# Patient Record
Sex: FEMALE | Race: BLACK OR AFRICAN AMERICAN | NOT HISPANIC OR LATINO
[De-identification: names, ages, dates, MRNs, and addresses within clinical notes are randomized per-mention and may not be internally consistent; named-entity substitution may affect disease eponyms.]

---

## 2024-07-11 ENCOUNTER — ASOB RESULT (OUTPATIENT)
Age: 44
End: 2024-07-11

## 2024-07-11 ENCOUNTER — RESULT REVIEW (OUTPATIENT)
Age: 44
End: 2024-07-11

## 2024-07-11 ENCOUNTER — INPATIENT (INPATIENT)
Facility: HOSPITAL | Age: 44
LOS: 3 days | Discharge: HOME | End: 2024-07-15
Attending: SPECIALIST | Admitting: SPECIALIST
Payer: COMMERCIAL

## 2024-07-11 ENCOUNTER — EMERGENCY (EMERGENCY)
Facility: HOSPITAL | Age: 44
LOS: 1 days | Discharge: STILL ADMITTED OR ADMINISTRATIVE | End: 2024-07-11
Admitting: EMERGENCY MEDICINE
Payer: COMMERCIAL

## 2024-07-11 ENCOUNTER — APPOINTMENT (OUTPATIENT)
Dept: ANTEPARTUM | Facility: CLINIC | Age: 44
End: 2024-07-11
Payer: COMMERCIAL

## 2024-07-11 VITALS
OXYGEN SATURATION: 99 % | HEART RATE: 108 BPM | SYSTOLIC BLOOD PRESSURE: 121 MMHG | TEMPERATURE: 98 F | DIASTOLIC BLOOD PRESSURE: 81 MMHG | RESPIRATION RATE: 20 BRPM

## 2024-07-11 VITALS — SYSTOLIC BLOOD PRESSURE: 117 MMHG | DIASTOLIC BLOOD PRESSURE: 65 MMHG | RESPIRATION RATE: 16 BRPM

## 2024-07-11 DIAGNOSIS — Z98.890 OTHER SPECIFIED POSTPROCEDURAL STATES: Chronic | ICD-10-CM

## 2024-07-11 DIAGNOSIS — O26.899 OTHER SPECIFIED PREGNANCY RELATED CONDITIONS, UNSPECIFIED TRIMESTER: ICD-10-CM

## 2024-07-11 DIAGNOSIS — N12 TUBULO-INTERSTITIAL NEPHRITIS, NOT SPECIFIED AS ACUTE OR CHRONIC: ICD-10-CM

## 2024-07-11 LAB
ALBUMIN SERPL ELPH-MCNC: 3 G/DL — LOW (ref 3.3–5)
ALP SERPL-CCNC: 91 U/L — SIGNIFICANT CHANGE UP (ref 40–120)
ALT FLD-CCNC: 41 U/L — HIGH (ref 4–33)
AMYLASE P1 CFR SERPL: 63 U/L — SIGNIFICANT CHANGE UP (ref 25–125)
ANION GAP SERPL CALC-SCNC: 13 MMOL/L — SIGNIFICANT CHANGE UP (ref 7–14)
APPEARANCE UR: ABNORMAL
AST SERPL-CCNC: 24 U/L — SIGNIFICANT CHANGE UP (ref 4–32)
BACTERIA # UR AUTO: ABNORMAL /HPF
BASOPHILS # BLD AUTO: 0.02 K/UL — SIGNIFICANT CHANGE UP (ref 0–0.2)
BASOPHILS NFR BLD AUTO: 0.2 % — SIGNIFICANT CHANGE UP (ref 0–2)
BILIRUB SERPL-MCNC: 0.2 MG/DL — SIGNIFICANT CHANGE UP (ref 0.2–1.2)
BILIRUB UR-MCNC: NEGATIVE — SIGNIFICANT CHANGE UP
BLD GP AB SCN SERPL QL: NEGATIVE — SIGNIFICANT CHANGE UP
BUN SERPL-MCNC: 4 MG/DL — LOW (ref 7–23)
CALCIUM SERPL-MCNC: 10.5 MG/DL — SIGNIFICANT CHANGE UP (ref 8.4–10.5)
CAST: 10 /LPF — HIGH (ref 0–4)
CHLORIDE SERPL-SCNC: 106 MMOL/L — SIGNIFICANT CHANGE UP (ref 98–107)
CO2 SERPL-SCNC: 17 MMOL/L — LOW (ref 22–31)
COLOR SPEC: YELLOW — SIGNIFICANT CHANGE UP
CREAT SERPL-MCNC: 0.7 MG/DL — SIGNIFICANT CHANGE UP (ref 0.5–1.3)
DIFF PNL FLD: ABNORMAL
EGFR: 111 ML/MIN/1.73M2 — SIGNIFICANT CHANGE UP
EOSINOPHIL # BLD AUTO: 0.01 K/UL — SIGNIFICANT CHANGE UP (ref 0–0.5)
EOSINOPHIL NFR BLD AUTO: 0.1 % — SIGNIFICANT CHANGE UP (ref 0–6)
GLUCOSE SERPL-MCNC: 98 MG/DL — SIGNIFICANT CHANGE UP (ref 70–99)
GLUCOSE UR QL: NEGATIVE MG/DL — SIGNIFICANT CHANGE UP
HBV SURFACE AG SERPL QL IA: SIGNIFICANT CHANGE UP
HCT VFR BLD CALC: 33.4 % — LOW (ref 34.5–45)
HGB BLD-MCNC: 10.9 G/DL — LOW (ref 11.5–15.5)
HIV 1+2 AB+HIV1 P24 AG SERPL QL IA: SIGNIFICANT CHANGE UP
IANC: 10.8 K/UL — HIGH (ref 1.8–7.4)
IMM GRANULOCYTES NFR BLD AUTO: 1.2 % — HIGH (ref 0–0.9)
KETONES UR-MCNC: 15 MG/DL
LEUKOCYTE ESTERASE UR-ACNC: ABNORMAL
LIDOCAIN IGE QN: 15 U/L — SIGNIFICANT CHANGE UP (ref 7–60)
LYMPHOCYTES # BLD AUTO: 0.82 K/UL — LOW (ref 1–3.3)
LYMPHOCYTES # BLD AUTO: 6.7 % — LOW (ref 13–44)
MCHC RBC-ENTMCNC: 27.5 PG — SIGNIFICANT CHANGE UP (ref 27–34)
MCHC RBC-ENTMCNC: 32.6 GM/DL — SIGNIFICANT CHANGE UP (ref 32–36)
MCV RBC AUTO: 84.3 FL — SIGNIFICANT CHANGE UP (ref 80–100)
MONOCYTES # BLD AUTO: 0.49 K/UL — SIGNIFICANT CHANGE UP (ref 0–0.9)
MONOCYTES NFR BLD AUTO: 4 % — SIGNIFICANT CHANGE UP (ref 2–14)
NEUTROPHILS # BLD AUTO: 10.8 K/UL — HIGH (ref 1.8–7.4)
NEUTROPHILS NFR BLD AUTO: 87.8 % — HIGH (ref 43–77)
NITRITE UR-MCNC: POSITIVE
NRBC # BLD: 0 /100 WBCS — SIGNIFICANT CHANGE UP (ref 0–0)
NRBC # FLD: 0 K/UL — SIGNIFICANT CHANGE UP (ref 0–0)
PCP SPEC-MCNC: SIGNIFICANT CHANGE UP
PH UR: 6 — SIGNIFICANT CHANGE UP (ref 5–8)
PLATELET # BLD AUTO: 200 K/UL — SIGNIFICANT CHANGE UP (ref 150–400)
POTASSIUM SERPL-MCNC: 3.8 MMOL/L — SIGNIFICANT CHANGE UP (ref 3.5–5.3)
POTASSIUM SERPL-SCNC: 3.8 MMOL/L — SIGNIFICANT CHANGE UP (ref 3.5–5.3)
PROT SERPL-MCNC: 6.7 G/DL — SIGNIFICANT CHANGE UP (ref 6–8.3)
PROT UR-MCNC: 30 MG/DL
RBC # BLD: 3.96 M/UL — SIGNIFICANT CHANGE UP (ref 3.8–5.2)
RBC # FLD: 14.9 % — HIGH (ref 10.3–14.5)
RBC CASTS # UR COMP ASSIST: 4 /HPF — SIGNIFICANT CHANGE UP (ref 0–4)
REVIEW: SIGNIFICANT CHANGE UP
RH IG SCN BLD-IMP: POSITIVE — SIGNIFICANT CHANGE UP
RH IG SCN BLD-IMP: POSITIVE — SIGNIFICANT CHANGE UP
SODIUM SERPL-SCNC: 136 MMOL/L — SIGNIFICANT CHANGE UP (ref 135–145)
SP GR SPEC: 1.02 — SIGNIFICANT CHANGE UP (ref 1–1.03)
SQUAMOUS # UR AUTO: 10 /HPF — HIGH (ref 0–5)
UROBILINOGEN FLD QL: 0.2 MG/DL — SIGNIFICANT CHANGE UP (ref 0.2–1)
WBC # BLD: 12.29 K/UL — HIGH (ref 3.8–10.5)
WBC # FLD AUTO: 12.29 K/UL — HIGH (ref 3.8–10.5)
WBC UR QL: 883 /HPF — HIGH (ref 0–5)

## 2024-07-11 PROCEDURE — L9996: CPT

## 2024-07-11 PROCEDURE — 76817 TRANSVAGINAL US OBSTETRIC: CPT | Mod: 26

## 2024-07-11 PROCEDURE — 76770 US EXAM ABDO BACK WALL COMP: CPT | Mod: 26

## 2024-07-11 PROCEDURE — 76815 OB US LIMITED FETUS(S): CPT | Mod: 26

## 2024-07-11 RX ORDER — CEFTRIAXONE SODIUM 500 MG
1000 VIAL (EA) INJECTION EVERY 24 HOURS
Refills: 0 | Status: DISCONTINUED | OUTPATIENT
Start: 2024-07-11 | End: 2024-07-15

## 2024-07-11 RX ORDER — DEXTROSE MONOHYDRATE AND SODIUM CHLORIDE 5; .3 G/100ML; G/100ML
1000 INJECTION, SOLUTION INTRAVENOUS ONCE
Refills: 0 | Status: COMPLETED | OUTPATIENT
Start: 2024-07-11 | End: 2024-07-11

## 2024-07-11 RX ORDER — ACETAMINOPHEN 325 MG
1000 TABLET ORAL ONCE
Refills: 0 | Status: COMPLETED | OUTPATIENT
Start: 2024-07-11 | End: 2024-07-11

## 2024-07-11 RX ORDER — HEPARIN SODIUM 50 [USP'U]/ML
5000 INJECTION, SOLUTION INTRAVENOUS EVERY 12 HOURS
Refills: 0 | Status: DISCONTINUED | OUTPATIENT
Start: 2024-07-11 | End: 2024-07-15

## 2024-07-11 RX ORDER — PRENATAL VIT/IRON FUM/FOLIC AC 60 MG-1 MG
1 TABLET ORAL DAILY
Refills: 0 | Status: DISCONTINUED | OUTPATIENT
Start: 2024-07-11 | End: 2024-07-15

## 2024-07-11 RX ORDER — DEXTROSE MONOHYDRATE AND SODIUM CHLORIDE 5; .3 G/100ML; G/100ML
1000 INJECTION, SOLUTION INTRAVENOUS
Refills: 0 | Status: DISCONTINUED | OUTPATIENT
Start: 2024-07-11 | End: 2024-07-14

## 2024-07-11 RX ADMIN — Medication 1000 MILLIGRAM(S): at 15:40

## 2024-07-11 RX ADMIN — Medication 400 MILLIGRAM(S): at 14:37

## 2024-07-11 RX ADMIN — DEXTROSE MONOHYDRATE AND SODIUM CHLORIDE 150 MILLILITER(S): 5; .3 INJECTION, SOLUTION INTRAVENOUS at 19:36

## 2024-07-11 RX ADMIN — HEPARIN SODIUM 5000 UNIT(S): 50 INJECTION, SOLUTION INTRAVENOUS at 17:59

## 2024-07-11 RX ADMIN — Medication 400 MILLIGRAM(S): at 23:46

## 2024-07-11 RX ADMIN — Medication 1 TABLET(S): at 17:56

## 2024-07-11 RX ADMIN — DEXTROSE MONOHYDRATE AND SODIUM CHLORIDE 150 MILLILITER(S): 5; .3 INJECTION, SOLUTION INTRAVENOUS at 11:25

## 2024-07-11 RX ADMIN — Medication 100 MILLIGRAM(S): at 11:14

## 2024-07-11 RX ADMIN — DEXTROSE MONOHYDRATE AND SODIUM CHLORIDE 1000 MILLILITER(S): 5; .3 INJECTION, SOLUTION INTRAVENOUS at 09:48

## 2024-07-11 NOTE — OB PROVIDER TRIAGE NOTE - NSHPPHYSICALEXAM_GEN_ALL_CORE
ICU Vital Signs Last 24 Hrs  T(C): 37.1 (11 Jul 2024 09:26), Max: 37.1 (11 Jul 2024 08:22)  T(F): 98.78 (11 Jul 2024 09:26), Max: 98.8 (11 Jul 2024 08:22)  HR: 102 (11 Jul 2024 08:22) (102 - 108)  BP: 117/67 (11 Jul 2024 08:22) (117/65 - 121/81)  BP(mean): --  ABP: --  ABP(mean): --  RR: 16 (11 Jul 2024 09:26) (16 - 20)  SpO2: 99% (11 Jul 2024 07:49) (99% - 99%)    General: Female sitting comfortably   Neuro: No facial asymmetry, no slurred speech, moves all 4 extremities  Mood: Alert and lucid, appropriate mood and affect  Head: Normocephalic. Atraumatic.   Eyes: No discharge, lids normal, conjunctiva normal  Lungs: No respiratory distress  Abdomen: Soft, nontender. Gravid. No contractions on palpation. CVA tenderness is negative    GBS collected and held  SSE- os appears closed. FFN collected and held  TVS- Images and report in ASOB-3.18-3.38 cm  NST reactive with mod variability; irritability on toco ICU Vital Signs Last 24 Hrs  T(C): 37.1 (11 Jul 2024 09:26), Max: 37.1 (11 Jul 2024 08:22)  T(F): 98.78 (11 Jul 2024 09:26), Max: 98.8 (11 Jul 2024 08:22)  HR: 102 (11 Jul 2024 08:22) (102 - 108)  BP: 117/67 (11 Jul 2024 08:22) (117/65 - 121/81)  BP(mean): --  ABP: --  ABP(mean): --  RR: 16 (11 Jul 2024 09:26) (16 - 20)  SpO2: 99% (11 Jul 2024 07:49) (99% - 99%)    General: Female sitting comfortably   Neuro: No facial asymmetry, no slurred speech, moves all 4 extremities  Mood: Alert and lucid, appropriate mood and affect  Head: Normocephalic. Atraumatic.   Eyes: No discharge, lids normal, conjunctiva normal  Lungs: No respiratory distress  Abdomen: Soft, nontender. Gravid. No contractions on palpation. CVA tenderness is negative    GBS collected and held  SSE- os appears closed. FFN collected and held  TVS- Images and report in ASOB-3.18-3.38 cm  Abd sono- Images and report in ASOB- sara breech; anterior placenta; MVP-10.08  NST reactive with mod variability; irritability on toco

## 2024-07-11 NOTE — OB PROVIDER TRIAGE NOTE - HISTORY OF PRESENT ILLNESS
45yo female  @ 23.6 wks SLIUP complicated by hypertensive gravidarum on Zofran pump with PNR with Dr Mtz of [facility] here complaining of constant left lower back pain that radiates around the flank and into the LLQ and down the leg that started at 3am and was unrelieved by Tylenol 500 mg taken at 3 am. Pt denies fever/ chills/ hematuria/ dysuria/ urgency/ frequency. Pt reports she she was diagnosed with a UTI 2 weeks ago and was treated and a test of cure was performed and she was scheduled to see her Ob today for results. Pt reports GFM and denies any vaginal bleeding/ LOF. Pt reports she was told she had a low lying placenta previa at 20 weeks.     PNC complicated by UTI 2 weeks ago and was treated

## 2024-07-11 NOTE — OB PROVIDER TRIAGE NOTE - NSOBPROVIDERNOTE_OBGYN_ALL_OB_FT
43yo female  @ 23.6 wks SLIUP with left lower back pain radiating to the LLQ and down the leg since 3am  -TVS reassuring with no evidence of  labor  -CBC/CMP/amylase/lipase/UA sent  -IV hydration with bolus ordered 45yo female  @ 23.6 wks SLIUP with left lower back pain radiating to the LLQ and down the leg since 3am  -TVS reassuring with no evidence of  labor  -CBC/CMP/amylase/lipase/UA sent  -IV hydration with bolus ordered  -----------------  - UA is positive for Nitrites  -pt was discussed with Dr Pisano  -pt ordered for ceftriaxone and renal ultrasound 43yo female  @ 23.6 wks SLIUP with left lower back pain radiating to the LLQ and down the leg since 3am  -TVS reassuring with no evidence of  labor  -CBC/CMP/amylase/lipase/UA sent  -IV hydration with bolus ordered  -----------------  - UA is positive for Nitrites  -pt was discussed with Dr Pisano  -pt ordered for ceftriaxone and renal ultrasound  ------------------------  12:00p-Pt was discussed with Dr Pisano and Dr Coello  -pt was admitted for pyelonephritis  -pt ordered for Ceftiaxone 1000mg QD and prophylactic heparin   -renal ultrasound is unremarkable

## 2024-07-11 NOTE — OB RN TRIAGE NOTE - FALL HARM RISK - UNIVERSAL INTERVENTIONS
Bed in lowest position, wheels locked, appropriate side rails in place/Call bell, personal items and telephone in reach/Instruct patient to call for assistance before getting out of bed or chair/Non-slip footwear when patient is out of bed/[place] to call system/Physically safe environment - no spills, clutter or unnecessary equipment/Purposeful Proactive Rounding/Room/bathroom lighting operational, light cord in reach

## 2024-07-11 NOTE — OB PROVIDER H&P - ASSESSMENT
45 yo female  @ 23.6 wks SLIUP here with pyelonephritis   -pt was admitted and discussed with Dr Pisano PGY4 and Dr Coello  -pt for ceftriaxone and prophylactic heparin  -UCX sent  -Utox ordered  -regular diet  -rpt CBC in morning   -Risks and benefits, alternatives, and possible complications have been discussed in detail with pt in her native language. Preadmission admission , and post admission procedures and expectations were discussed in detail. All questions answered, all appropriate hospital consents were signed. Anticipate normal vaginal delivery.  -Informed consent was obtained. The following were discussed:    -induction/augmentation of labor: use of medication and/or cook balloon to begin or enhance labor    -obstetrical management including internal fetal/contraction monitoring

## 2024-07-11 NOTE — OB PROVIDER H&P - NSHPPHYSICALEXAM_GEN_ALL_CORE
ICU Vital Signs Last 24 Hrs  T(C): 37.1 (11 Jul 2024 09:26), Max: 37.1 (11 Jul 2024 08:22)  T(F): 98.78 (11 Jul 2024 09:26), Max: 98.8 (11 Jul 2024 08:22)  HR: 102 (11 Jul 2024 08:22) (102 - 108)  BP: 117/67 (11 Jul 2024 08:22) (117/65 - 121/81)  BP(mean): --  ABP: --  ABP(mean): --  RR: 16 (11 Jul 2024 09:26) (16 - 20)  SpO2: 99% (11 Jul 2024 07:49) (99% - 99%)    General: Female sitting comfortably   Neuro: No facial asymmetry, no slurred speech, moves all 4 extremities  Mood: Alert and lucid, appropriate mood and affect  Head: Normocephalic. Atraumatic.   Eyes: No discharge, lids normal, conjunctiva normal  Lungs: No respiratory distress  Abdomen: Soft, nontender. Gravid. No contractions on palpation. CVA tenderness is negative    GBS collected and held  SSE- os appears closed. FFN collected and held  TVS- Images and report in ASOB-3.18-3.38 cm  Abd sono- Images and report in ASOB- sara breech; anterior placenta; MVP-10.08  NST reactive with mod variability; irritability on toco

## 2024-07-11 NOTE — OB RN PATIENT PROFILE - FALL HARM RISK - UNIVERSAL INTERVENTIONS
Bed in lowest position, wheels locked, appropriate side rails in place/Call bell, personal items and telephone in reach/Instruct patient to call for assistance before getting out of bed or chair/Non-slip footwear when patient is out of bed/[place] to call system/Physically safe environment - no spills, clutter or unnecessary equipment/Purposeful Proactive Rounding/Room/bathroom lighting operational, light cord in reach

## 2024-07-11 NOTE — OB PROVIDER TRIAGE NOTE - NSHPLABSRESULTS_GEN_ALL_CORE
Urinalysis Basic - ( 2024 10:05 )    Color: Yellow / Appearance: Turbid / S.023 / pH: x  Gluc: 98 mg/dL / Ketone: 15 mg/dL  / Bili: Negative / Urobili: 0.2 mg/dL   Blood: x / Protein: 30 mg/dL / Nitrite: Positive   Leuk Esterase: Moderate / RBC: x / WBC x   Sq Epi: x / Non Sq Epi: x / Bacteria: x    136 I 106 I 4  ----------------<98     AST-24; ALT- 41 <H>; Amylase- 63; lipase- 15  3.8 I 17 I 0.70                 10.9  12.29>----------<200               33.4

## 2024-07-12 ENCOUNTER — ASOB RESULT (OUTPATIENT)
Age: 44
End: 2024-07-12

## 2024-07-12 ENCOUNTER — APPOINTMENT (OUTPATIENT)
Dept: ANTEPARTUM | Facility: HOSPITAL | Age: 44
End: 2024-07-12
Payer: COMMERCIAL

## 2024-07-12 PROBLEM — R11.10 VOMITING, UNSPECIFIED: Chronic | Status: ACTIVE | Noted: 2024-07-11

## 2024-07-12 LAB
BASOPHILS # BLD AUTO: 0.03 K/UL — SIGNIFICANT CHANGE UP (ref 0–0.2)
BASOPHILS NFR BLD AUTO: 0.3 % — SIGNIFICANT CHANGE UP (ref 0–2)
EOSINOPHIL # BLD AUTO: 0.1 K/UL — SIGNIFICANT CHANGE UP (ref 0–0.5)
EOSINOPHIL NFR BLD AUTO: 1.1 % — SIGNIFICANT CHANGE UP (ref 0–6)
HCT VFR BLD CALC: 27.5 % — LOW (ref 34.5–45)
HCV AB S/CO SERPL IA: 0.11 S/CO — SIGNIFICANT CHANGE UP (ref 0–0.99)
HCV AB SERPL-IMP: SIGNIFICANT CHANGE UP
HGB BLD-MCNC: 9 G/DL — LOW (ref 11.5–15.5)
IANC: 6.61 K/UL — SIGNIFICANT CHANGE UP (ref 1.8–7.4)
IMM GRANULOCYTES NFR BLD AUTO: 1.5 % — HIGH (ref 0–0.9)
LYMPHOCYTES # BLD AUTO: 1.5 K/UL — SIGNIFICANT CHANGE UP (ref 1–3.3)
LYMPHOCYTES # BLD AUTO: 16.6 % — SIGNIFICANT CHANGE UP (ref 13–44)
MCHC RBC-ENTMCNC: 27.6 PG — SIGNIFICANT CHANGE UP (ref 27–34)
MCHC RBC-ENTMCNC: 32.7 GM/DL — SIGNIFICANT CHANGE UP (ref 32–36)
MCV RBC AUTO: 84.4 FL — SIGNIFICANT CHANGE UP (ref 80–100)
MONOCYTES # BLD AUTO: 0.67 K/UL — SIGNIFICANT CHANGE UP (ref 0–0.9)
MONOCYTES NFR BLD AUTO: 7.4 % — SIGNIFICANT CHANGE UP (ref 2–14)
NEUTROPHILS # BLD AUTO: 6.61 K/UL — SIGNIFICANT CHANGE UP (ref 1.8–7.4)
NEUTROPHILS NFR BLD AUTO: 73.1 % — SIGNIFICANT CHANGE UP (ref 43–77)
NRBC # BLD: 0 /100 WBCS — SIGNIFICANT CHANGE UP (ref 0–0)
NRBC # FLD: 0 K/UL — SIGNIFICANT CHANGE UP (ref 0–0)
PLATELET # BLD AUTO: 179 K/UL — SIGNIFICANT CHANGE UP (ref 150–400)
RBC # BLD: 3.26 M/UL — LOW (ref 3.8–5.2)
RBC # FLD: 14.9 % — HIGH (ref 10.3–14.5)
RUBV IGG SER-ACNC: 11 INDEX — SIGNIFICANT CHANGE UP
RUBV IGG SER-IMP: POSITIVE — SIGNIFICANT CHANGE UP
T PALLIDUM AB TITR SER: NEGATIVE — SIGNIFICANT CHANGE UP
WBC # BLD: 9.05 K/UL — SIGNIFICANT CHANGE UP (ref 3.8–10.5)
WBC # FLD AUTO: 9.05 K/UL — SIGNIFICANT CHANGE UP (ref 3.8–10.5)

## 2024-07-12 PROCEDURE — 99232 SBSQ HOSP IP/OBS MODERATE 35: CPT

## 2024-07-12 PROCEDURE — 76805 OB US >/= 14 WKS SNGL FETUS: CPT | Mod: 26

## 2024-07-12 RX ORDER — SENNOSIDES 8.6 MG
2 TABLET ORAL AT BEDTIME
Refills: 0 | Status: DISCONTINUED | OUTPATIENT
Start: 2024-07-12 | End: 2024-07-15

## 2024-07-12 RX ORDER — ACETAMINOPHEN 325 MG
1000 TABLET ORAL ONCE
Refills: 0 | Status: COMPLETED | OUTPATIENT
Start: 2024-07-12 | End: 2024-07-12

## 2024-07-12 RX ORDER — OXYCODONE HYDROCHLORIDE 100 MG/5ML
5 SOLUTION ORAL ONCE
Refills: 0 | Status: DISCONTINUED | OUTPATIENT
Start: 2024-07-12 | End: 2024-07-13

## 2024-07-12 RX ORDER — POLYETHYLENE GLYCOL 3350 1 G/G
17 POWDER ORAL
Refills: 0 | Status: DISCONTINUED | OUTPATIENT
Start: 2024-07-12 | End: 2024-07-15

## 2024-07-12 RX ORDER — ONDANSETRON HYDROCHLORIDE 2 MG/ML
4 INJECTION INTRAMUSCULAR; INTRAVENOUS EVERY 4 HOURS
Refills: 0 | Status: DISCONTINUED | OUTPATIENT
Start: 2024-07-12 | End: 2024-07-15

## 2024-07-12 RX ORDER — OXYCODONE HYDROCHLORIDE 100 MG/5ML
5 SOLUTION ORAL ONCE
Refills: 0 | Status: DISCONTINUED | OUTPATIENT
Start: 2024-07-12 | End: 2024-07-12

## 2024-07-12 RX ADMIN — HEPARIN SODIUM 5000 UNIT(S): 50 INJECTION, SOLUTION INTRAVENOUS at 22:11

## 2024-07-12 RX ADMIN — POLYETHYLENE GLYCOL 3350 17 GRAM(S): 1 POWDER ORAL at 17:36

## 2024-07-12 RX ADMIN — Medication 1000 MILLIGRAM(S): at 06:40

## 2024-07-12 RX ADMIN — Medication 1 TABLET(S): at 12:35

## 2024-07-12 RX ADMIN — Medication 1000 MILLIGRAM(S): at 22:20

## 2024-07-12 RX ADMIN — OXYCODONE HYDROCHLORIDE 5 MILLIGRAM(S): 100 SOLUTION ORAL at 13:30

## 2024-07-12 RX ADMIN — Medication 100 MILLIGRAM(S): at 12:34

## 2024-07-12 RX ADMIN — HEPARIN SODIUM 5000 UNIT(S): 50 INJECTION, SOLUTION INTRAVENOUS at 12:35

## 2024-07-12 RX ADMIN — ONDANSETRON HYDROCHLORIDE 4 MILLIGRAM(S): 2 INJECTION INTRAMUSCULAR; INTRAVENOUS at 17:35

## 2024-07-12 RX ADMIN — DEXTROSE MONOHYDRATE AND SODIUM CHLORIDE 150 MILLILITER(S): 5; .3 INJECTION, SOLUTION INTRAVENOUS at 19:35

## 2024-07-12 RX ADMIN — Medication 400 MILLIGRAM(S): at 06:13

## 2024-07-12 RX ADMIN — Medication 1000 MILLIGRAM(S): at 00:15

## 2024-07-12 RX ADMIN — OXYCODONE HYDROCHLORIDE 5 MILLIGRAM(S): 100 SOLUTION ORAL at 12:34

## 2024-07-12 RX ADMIN — DEXTROSE MONOHYDRATE AND SODIUM CHLORIDE 150 MILLILITER(S): 5; .3 INJECTION, SOLUTION INTRAVENOUS at 09:04

## 2024-07-12 RX ADMIN — Medication 400 MILLIGRAM(S): at 21:51

## 2024-07-12 NOTE — PROGRESS NOTE ADULT - ASSESSMENT
A/P: 43yo  at 24w0d admitted with pyelo i/s/o outpatient UCx () +K. pneumoniae with back pain. Pt started on ceftriaxone . Pt feels improved this AM. Pt hemodynamically stable.     #Pyelo  - UA (): +nitrites/mod LE, many bacteria  - Kidney/bladder sono (): wnl, no evidence of stone  - Tylenol PRN for pain  - c/w CTX (-)    #Fetal wellbeing  - NST qd  - f/u GBS ()    #Maternal wellbeing  - Regular diet  - HSQ for DVT ppx  - PNV  - f/u UCx ()    Jonh Spangler, PGY-3

## 2024-07-12 NOTE — DISCHARGE NOTE ANTEPARTUM - PLAN OF CARE
43yo  @24w0d a/w pyelonephritis.     Plan:  -F/up with OB within 1 week 45yo  @24w0d a/w pyelonephritis.     Plan:  -F/up with OB within 1 week with Dr Joshua; will need follow up urine culture for test of cure when antibiotics completed  - Return with contractions, vaginal bleeding, leaking fluid or decreased fetal movement  - Complete all antibiotics (bactrim twice daily) for 10 more days then transition to Macrobid once daily for duration of pregnancy for suppression of UTIs

## 2024-07-12 NOTE — DISCHARGE NOTE ANTEPARTUM - CARE PROVIDER_API CALL
[address]  [address], [address]  [place] [address]  Phone: [phone]  Fax: ([phone]  Scheduled Appointment: 07/17/2024 12:30 PM

## 2024-07-12 NOTE — DISCHARGE NOTE ANTEPARTUM - HOSPITAL COURSE
43yo  admitted with pyelo i/s/o outpatient UCx () +K. pneumoniae with back pain. Pt started on ceftriaxone . UA (): +nitrites, moderate leukocyte esterace, many bacteria. Kidney/bladder ultrasound (): right kidney - 10.3cm, no renal mass, hydronephrosis or calculi. left kidney - 10.4cm, no renal mass, hydronephrosis, or calculi. Bladder WNL. Fetal status reassuring.     Urine culture showed _______.     Patient discharged on _____.     Called OB, Dr. Vicente's office and patient made appointment for  @130pm.     Cleared for d/c by KELY.    43yo  admitted with pyelo i/s/o outpatient UCx () +K. pneumoniae with back pain. Pt started on ceftriaxone . UA (): +nitrites, moderate leukesterace, many bacteria. Kidney/bladder ultrasound (): right kidney - 10.3cm, no renal mass, hydronephrosis or calculi. left kidney - 10.4cm, no renal mass, hydronephrosis, or calculi. Bladder WNL. Fetal status reassuring.   Patient given ceftriaxone IV from -7/15 Then transitioned to PO Bactrim prior to discharge.     Urine culture showed K. Sensitive to everything except Ampicillin; since she was on keflex outpatient, decision made to transition to Bactrim BID to complete 14 day course of antibiotics then transition to Macrobid daily for Supression.       Patient discharged on  Bactrim BID for 10 more days and Macrobid to start at completion of Bactrim course.      Called OB, Dr. Vicente's office and patient made appointment for  @130pm. Called Office to discuss hospital course and [facility] recs with Dr Saab.     Cleared for d/c by [facility].

## 2024-07-12 NOTE — DISCHARGE NOTE ANTEPARTUM - NS MD DC FALL RISK RISK
For information on Fall & Injury Prevention, visit: [email or web address] OR  [email or web address] OR  https://www.cdc.gov/steadi/patient.html

## 2024-07-12 NOTE — DISCHARGE NOTE ANTEPARTUM - MEDICATION SUMMARY - MEDICATIONS TO TAKE
I will START or STAY ON the medications listed below when I get home from the hospital:    Zofran Pump  -- Indication: For Hyperemesis    PNV Prenatal oral tablet  -- 1 tab(s) by mouth once a day  -- Indication: For Pregnancy    sulfamethoxazole-trimethoprim 800 mg-160 mg oral tablet  -- 1 tab(s) by mouth every 12 hours  -- Indication: For Pyelonephritis    Macrodantin 100 mg oral capsule  -- 1 cap(s) by mouth once a day TO BEGIN AFTER COMPLETION OF BACTRIM COURSE for suppression of UTIs in pregnancy  -- Indication: For Pyelonephritis   I will START or STAY ON the medications listed below when I get home from the hospital:    Zofran Pump  -- Indication: For Hyperemesis    PNV Prenatal oral tablet  -- 1 tab(s) by mouth once a day  -- Indication: For Pregnancy    sulfamethoxazole-trimethoprim 800 mg-160 mg oral tablet  -- 1 tab(s) by mouth every 12 hours  -- Indication: For Pyelonephritis    nitrofurantoin macrocrystals 100 mg oral capsule  -- 1 cap(s) by mouth once a day TO BEGIN AFTER COMPLETION OF BACRTIM COURSE; TAKE DAILY for suppression of UTIs in pregnancy  -- Indication: For Pyelonephritis

## 2024-07-12 NOTE — PROGRESS NOTE ADULT - NSPROGADDITIONALINFOA_GEN_ALL_CORE
44 y.o. P0 at 24w0d admitted for suspected pyelonephritis. This morning with ongoing back pain and left flank pain that has improved to 6/10. Also reporting chronic constipation as well as intermittent nausea which did not allow her to tolerate her oral antibiotics at home. Stable vital signs with intermittent tachycardia. Leukocytosis improving. Exam without significant CVA tenderness.     - Given ongoing left flank pain, recommend ongoing IV CTX and follow up of urine cultures for appropriate outpatient regimen  - Discussed bowel regimen with patient in outpatient context with daily Miralax until regular bowel movements ensured as this can be contributing to her intermittent nausea as well.   - Reviewed that given her diagnosis of pyelonephritis she would benefit from suppressive therapy for the duration of her pregnancy  - continue Tylenol for pain management. Oxycodone PRN if needed but re-assess for dispo pending pain control     Seen and evaluated with Dr.Yaghoubian Margaux Lau PGY-5 MFM Fellow

## 2024-07-12 NOTE — DISCHARGE NOTE ANTEPARTUM - PATIENT PORTAL LINK FT
You can access the FollowMyHealth Patient Portal offered by [facility] by registering at the following website: [email or web address]. By joining [employer]’s FollowMyHealth portal, you will also be able to view your health information using other applications (apps) compatible with our system.

## 2024-07-12 NOTE — DISCHARGE NOTE ANTEPARTUM - CARE PLAN
Principal Discharge DX:	Pyelonephritis  Assessment and plan of treatment:	45yo  @24w0d a/w pyelonephritis.     Plan:  -F/up with OB within 1 week   1 Principal Discharge DX:	Pyelonephritis  Assessment and plan of treatment:	43yo  @24w0d a/w pyelonephritis.     Plan:  -F/up with OB within 1 week with Dr Joshua; will need follow up urine culture for test of cure when antibiotics completed  - Return with contractions, vaginal bleeding, leaking fluid or decreased fetal movement  - Complete all antibiotics (bactrim twice daily) for 10 more days then transition to Macrobid once daily for duration of pregnancy for suppression of UTIs

## 2024-07-12 NOTE — PROGRESS NOTE ADULT - SUBJECTIVE AND OBJECTIVE BOX
PGY3 Antepartum Note    Patient seen and examined at bedside in NAD. States her L back pain is improved from 10/10 to 6/10 in severity. Reports mild nausea that is consistent with her baseline. Denies any CTX, LOF, or VB.  Reports good fetal movement. Denies fevers/chills, vomiting, CP, SOB, abdominal pain, dysuria.    T(F): 98.5 (05:34)  HR: 101 (05:36)  BP: 86/52 (05:36)  RR: 18 (05:34)    Gen: NAD  Cardio: rrr, s1/s2  Pulm: ca b/l  Abd: gravid, nontender, no palpable contractions  Back: no CVA tenderness b/l  Ext: no edema, no calf tenderness  SVE: deferred    Medications:  (ADM OVERRIDE): 1 Each (24 @ 10:32)  acetaminophen   IVPB ..: 400 mL/Hr (24 @ 14:37)  acetaminophen   IVPB ..: 400 mL/Hr (24 @ 23:46)  acetaminophen   IVPB ..: 400 mL/Hr (24 @ 06:13)  cefTRIAXone   IVPB: 100 mL/Hr (24 @ 11:14)  heparin   Injectable: 5000 Unit(s) (24 @ 17:59)  lactated ringers Bolus: 1000 mL/Hr (24 @ 09:48)  lactated ringers.: 150 mL/Hr (24 @ 11:25),  150 mL/Hr (24 @ 09:30)  prenatal multivitamin: 1 Tablet(s) (24 @ 17:56)      Labs:                        9.0    9.05  )-----------( 179      ( 2024 05:20 )             27.5         136  |  106  |  4<L>  ----------------------------<  98  3.8   |  17<L>  |  0.70    Ca    10.5      2024 10:05    TPro  6.7  /  Alb  3.0<L>  /  TBili  0.2  /  DBili  x   /  AST  24  /  ALT  41<H>  /  AlkPhos  91        Urinalysis Basic - ( 2024 10:05 )    Color: Yellow / Appearance: Turbid / S.023 / pH: x  Gluc: 98 mg/dL / Ketone: 15 mg/dL  / Bili: Negative / Urobili: 0.2 mg/dL   Blood: x / Protein: 30 mg/dL / Nitrite: Positive   Leuk Esterase: Moderate / RBC: 4 /HPF /  /HPF   Sq Epi: x / Non Sq Epi: 10 /HPF / Bacteria: Many /HPF      Drug Screen W/PCP, Urine: Done (24 @ 13:10)

## 2024-07-12 NOTE — CHART NOTE - NSCHARTNOTEFT_GEN_A_CORE
Outpatient urine culture from 6/20/24 positive for >100,000 k. Pneumoniae; sensitive to current ceftriaxone regimen.   Pt reports she was treated for the UTI with Keflex 4 times per day, but had trouble keeps doses down due to Hyperemesis.     Will follow up in patient urine culture.    Pt currently endorses back pain.     Nhung Cavanaugh NP
PA Note    Called Dr. Vicente's office @[phone].    Spoke with Brook Marcano, Physician Assistant. Updated on status of patient and will closely follow up outpatient.    Patient scheduled for outpatient follow up on 7/17 @130pm. Patient informed and information on discharge paperwork.     Ivy Huynh PA-C
PA Note    Spoke with patient. Discontinuing Zofran pump.     Ordered for Zofran 4mg Q4 PRN.    Patient understands that Zofran pump is being managed by outpatient nursing service. Spoke with Dr. Mcqueen's office and will reinstate the pump on discharge.    Ivy Huynh PA-C
I have reviewed and confirmed nurses' notes for patient's medications, allergies, medical history, and surgical history.

## 2024-07-13 LAB
BASOPHILS # BLD AUTO: 0.02 K/UL — SIGNIFICANT CHANGE UP (ref 0–0.2)
BASOPHILS NFR BLD AUTO: 0.3 % — SIGNIFICANT CHANGE UP (ref 0–2)
BLD GP AB SCN SERPL QL: NEGATIVE — SIGNIFICANT CHANGE UP
EOSINOPHIL # BLD AUTO: 0.16 K/UL — SIGNIFICANT CHANGE UP (ref 0–0.5)
EOSINOPHIL NFR BLD AUTO: 2.1 % — SIGNIFICANT CHANGE UP (ref 0–6)
HCT VFR BLD CALC: 30.3 % — LOW (ref 34.5–45)
HGB BLD-MCNC: 9.3 G/DL — LOW (ref 11.5–15.5)
IANC: 5.81 K/UL — SIGNIFICANT CHANGE UP (ref 1.8–7.4)
IMM GRANULOCYTES NFR BLD AUTO: 1.6 % — HIGH (ref 0–0.9)
LYMPHOCYTES # BLD AUTO: 1.12 K/UL — SIGNIFICANT CHANGE UP (ref 1–3.3)
LYMPHOCYTES # BLD AUTO: 14.5 % — SIGNIFICANT CHANGE UP (ref 13–44)
MCHC RBC-ENTMCNC: 26.4 PG — LOW (ref 27–34)
MCHC RBC-ENTMCNC: 30.7 GM/DL — LOW (ref 32–36)
MCV RBC AUTO: 86.1 FL — SIGNIFICANT CHANGE UP (ref 80–100)
MONOCYTES # BLD AUTO: 0.49 K/UL — SIGNIFICANT CHANGE UP (ref 0–0.9)
MONOCYTES NFR BLD AUTO: 6.3 % — SIGNIFICANT CHANGE UP (ref 2–14)
NEUTROPHILS # BLD AUTO: 5.81 K/UL — SIGNIFICANT CHANGE UP (ref 1.8–7.4)
NEUTROPHILS NFR BLD AUTO: 75.2 % — SIGNIFICANT CHANGE UP (ref 43–77)
NRBC # BLD: 0 /100 WBCS — SIGNIFICANT CHANGE UP (ref 0–0)
NRBC # FLD: 0 K/UL — SIGNIFICANT CHANGE UP (ref 0–0)
PLATELET # BLD AUTO: 185 K/UL — SIGNIFICANT CHANGE UP (ref 150–400)
RBC # BLD: 3.52 M/UL — LOW (ref 3.8–5.2)
RBC # FLD: 14.9 % — HIGH (ref 10.3–14.5)
RH IG SCN BLD-IMP: POSITIVE — SIGNIFICANT CHANGE UP
WBC # BLD: 7.72 K/UL — SIGNIFICANT CHANGE UP (ref 3.8–10.5)
WBC # FLD AUTO: 7.72 K/UL — SIGNIFICANT CHANGE UP (ref 3.8–10.5)

## 2024-07-13 PROCEDURE — 99232 SBSQ HOSP IP/OBS MODERATE 35: CPT

## 2024-07-13 RX ORDER — ACETAMINOPHEN 325 MG
975 TABLET ORAL ONCE
Refills: 0 | Status: COMPLETED | OUTPATIENT
Start: 2024-07-13 | End: 2024-07-13

## 2024-07-13 RX ADMIN — OXYCODONE HYDROCHLORIDE 5 MILLIGRAM(S): 100 SOLUTION ORAL at 02:00

## 2024-07-13 RX ADMIN — ONDANSETRON HYDROCHLORIDE 4 MILLIGRAM(S): 2 INJECTION INTRAMUSCULAR; INTRAVENOUS at 14:18

## 2024-07-13 RX ADMIN — Medication 975 MILLIGRAM(S): at 19:33

## 2024-07-13 RX ADMIN — HEPARIN SODIUM 5000 UNIT(S): 50 INJECTION, SOLUTION INTRAVENOUS at 11:51

## 2024-07-13 RX ADMIN — HEPARIN SODIUM 5000 UNIT(S): 50 INJECTION, SOLUTION INTRAVENOUS at 22:45

## 2024-07-13 RX ADMIN — DEXTROSE MONOHYDRATE AND SODIUM CHLORIDE 150 MILLILITER(S): 5; .3 INJECTION, SOLUTION INTRAVENOUS at 07:50

## 2024-07-13 RX ADMIN — OXYCODONE HYDROCHLORIDE 5 MILLIGRAM(S): 100 SOLUTION ORAL at 01:23

## 2024-07-13 RX ADMIN — Medication 100 MILLIGRAM(S): at 11:51

## 2024-07-13 RX ADMIN — ONDANSETRON HYDROCHLORIDE 4 MILLIGRAM(S): 2 INJECTION INTRAMUSCULAR; INTRAVENOUS at 07:45

## 2024-07-13 RX ADMIN — Medication 975 MILLIGRAM(S): at 19:39

## 2024-07-13 RX ADMIN — Medication 1 TABLET(S): at 11:52

## 2024-07-13 RX ADMIN — Medication 2 TABLET(S): at 23:09

## 2024-07-13 RX ADMIN — POLYETHYLENE GLYCOL 3350 17 GRAM(S): 1 POWDER ORAL at 07:50

## 2024-07-13 NOTE — PROGRESS NOTE ADULT - SUBJECTIVE AND OBJECTIVE BOX
PGY 3 Antepartum Note    Patient seen and examined at bedside in NAD. Pt reports mild nausea that is consistent with her baseline. Her back pain is similar to yesterday. Denies any CTX, LOF or VB.  Reports good fetal movement. Denies fever/chills, vomiting, chest pain, SOB, abdominal pain, dysuria.     T(F): 97.9 (01:10)  HR: 86 (01:11)  BP: 102/59 (01:10)  RR: 18 (01:10)      Gen: NAD  Cardio: rrr, s1/s2  Pulm: ca b/l  Abd: gravid, nontender, no palpable contractions  Ext: no edema,  no calf enderness  SVE: deferred    Medications:  (ADM OVERRIDE): 1 Each (24 @ 10:32)  acetaminophen   IVPB ..: 400 mL/Hr (24 @ 23:46)  acetaminophen   IVPB ..: 400 mL/Hr (24 @ 14:37)  acetaminophen   IVPB ..: 400 mL/Hr (24 @ 06:13)  acetaminophen   IVPB ..: 400 mL/Hr (24 @ 21:51)  cefTRIAXone   IVPB: 100 mL/Hr (24 @ 12:34),  100 mL/Hr (24 @ 11:14)  heparin   Injectable: 5000 Unit(s) (24 @ 22:11),  5000 Unit(s) (24 @ 12:35),  5000 Unit(s) (24 @ 17:59)  lactated ringers Bolus: 1000 mL/Hr (24 @ 09:48)  lactated ringers.: 150 mL/Hr (24 @ 09:05),  150 mL/Hr (24 @ 19:37),  150 mL/Hr (24 @ 11:25),  150 mL/Hr (24 @ 09:30)  oxyCODONE    IR: 5 milliGRAM(s) (24 @ 12:34)  polyethylene glycol 3350: 17 Gram(s) (24 @ 17:36)  prenatal multivitamin: 1 Tablet(s) (24 @ 12:35),  1 Tablet(s) (24 @ 17:56)      Labs:                        9.0    9.05  )-----------( 179      ( 2024 05:20 )             27.5         136  |  106  |  4<L>  ----------------------------<  98  3.8   |  17<L>  |  0.70    Ca    10.5      2024 10:05    TPro  6.7  /  Alb  3.0<L>  /  TBili  0.2  /  DBili  x   /  AST  24  /  ALT  41<H>  /  AlkPhos  91  07-11      Urinalysis Basic - ( 2024 10:05 )    Color: Yellow / Appearance: Turbid / S.023 / pH: x  Gluc: 98 mg/dL / Ketone: 15 mg/dL  / Bili: Negative / Urobili: 0.2 mg/dL   Blood: x / Protein: 30 mg/dL / Nitrite: Positive   Leuk Esterase: Moderate / RBC: 4 /HPF /  /HPF   Sq Epi: x / Non Sq Epi: 10 /HPF / Bacteria: Many /HPF     PGY 3 Antepartum Note    Patient seen and examined at bedside in NAD. Her back pain is similar to yesterday. She rates it as a 5/10 in severity. It is mostly on her R flank radiating down to her suprapubic area. Denies any CTX, LOF or VB.  Reports good fetal movement. Denies fever but endorses chills overnight. Denies vomiting, chest pain, SOB, abdominal pain, dysuria.     T(F): 97.9 (01:10)  HR: 86 (:11)  BP: 102/59 (01:10)  RR: 18 (01:10)      Gen: NAD, standing at bedside and appears comfortable  Cardio: rrr, s1/s2  Pulm: ca b/l  Abd: gravid, nontender, no palpable contractions. Mild suprapubic tenderness.   Back: no CVA tenderness (Pt reports that exam "feels good")  Ext: no edema,  no calf enderness  SVE: deferred    Medications:  (ADM OVERRIDE): 1 Each (24 @ 10:32)  acetaminophen   IVPB ..: 400 mL/Hr (24 @ 23:46)  acetaminophen   IVPB ..: 400 mL/Hr (24 @ 14:37)  acetaminophen   IVPB ..: 400 mL/Hr (24 @ 06:13)  acetaminophen   IVPB ..: 400 mL/Hr (24 @ 21:51)  cefTRIAXone   IVPB: 100 mL/Hr (24 @ 12:34),  100 mL/Hr (24 @ 11:14)  heparin   Injectable: 5000 Unit(s) (24 @ 22:11),  5000 Unit(s) (24 @ 12:35),  5000 Unit(s) (24 @ 17:59)  lactated ringers Bolus: 1000 mL/Hr (24 @ 09:48)  lactated ringers.: 150 mL/Hr (24 @ 09:05),  150 mL/Hr (24 @ 19:37),  150 mL/Hr (24 @ 11:25),  150 mL/Hr (24 @ 09:30)  oxyCODONE    IR: 5 milliGRAM(s) (24 @ 12:34)  polyethylene glycol 3350: 17 Gram(s) (24 @ 17:36)  prenatal multivitamin: 1 Tablet(s) (24 @ 12:35),  1 Tablet(s) (24 @ 17:56)      Labs:                        9.0    9.05  )-----------( 179      ( 2024 05:20 )             27.5         136  |  106  |  4<L>  ----------------------------<  98  3.8   |  17<L>  |  0.70    Ca    10.5      2024 10:05    TPro  6.7  /  Alb  3.0<L>  /  TBili  0.2  /  DBili  x   /  AST  24  /  ALT  41<H>  /  AlkPhos  91  07-11      Urinalysis Basic - ( 2024 10:05 )    Color: Yellow / Appearance: Turbid / S.023 / pH: x  Gluc: 98 mg/dL / Ketone: 15 mg/dL  / Bili: Negative / Urobili: 0.2 mg/dL   Blood: x / Protein: 30 mg/dL / Nitrite: Positive   Leuk Esterase: Moderate / RBC: 4 /HPF /  /HPF   Sq Epi: x / Non Sq Epi: 10 /HPF / Bacteria: Many /HPF

## 2024-07-13 NOTE — PROGRESS NOTE ADULT - ASSESSMENT
A/P: 45yo  at 24w1d admitted with pyelo i/s/o outpatient UCx () +K. pneumoniae with back pain. Pt started on ceftriaxone . Pt required IV Tylenol and Oxycodone 5mg IR once each overnight. She states that this morning her pain is improved.      #Pyelo  - UA (): +nitrites/mod LE, many bacteria  - Kidney/bladder sono (): wnl, no evidence of stone  - Tylenol PRN for pain  - c/w CTX (-)    #Fetal wellbeing  - NST qd  - f/u GBS ()    #Maternal wellbeing  - Regular diet  - HSQ for DVT ppx  - PNV  - f/u UCx ()    Amanda Ott PGY3 A/P: 45yo  at 24w1d admitted with pyelo i/s/o outpatient UCx () +K. pneumoniae with back pain. Pt started on ceftriaxone . Pt required IV Tylenol and Oxycodone 5mg IR once each overnight. She states that this morning her pain is slowly improving.     #Pyelo  - UA (): +nitrites/mod LE, many bacteria  - Kidney/bladder sono (): wnl, no evidence of stone  - Tylenol PRN for pain  - c/w CTX (-)    #Fetal wellbeing  - NST qd  - f/u GBS ()    #Maternal wellbeing  - Regular diet  - HSQ for DVT ppx  - PNV  - f/u UCx ()    Amanda Ott PGY3

## 2024-07-14 PROCEDURE — 99232 SBSQ HOSP IP/OBS MODERATE 35: CPT

## 2024-07-14 RX ORDER — SODIUM CHLORIDE 0.9 % (FLUSH) 0.9 %
3 SYRINGE (ML) INJECTION EVERY 8 HOURS
Refills: 0 | Status: DISCONTINUED | OUTPATIENT
Start: 2024-07-14 | End: 2024-07-15

## 2024-07-14 RX ORDER — ACETAMINOPHEN 325 MG
650 TABLET ORAL EVERY 6 HOURS
Refills: 0 | Status: DISCONTINUED | OUTPATIENT
Start: 2024-07-14 | End: 2024-07-14

## 2024-07-14 RX ORDER — ACETAMINOPHEN 325 MG
975 TABLET ORAL EVERY 6 HOURS
Refills: 0 | Status: DISCONTINUED | OUTPATIENT
Start: 2024-07-14 | End: 2024-07-15

## 2024-07-14 RX ADMIN — Medication 975 MILLIGRAM(S): at 18:40

## 2024-07-14 RX ADMIN — Medication 650 MILLIGRAM(S): at 09:52

## 2024-07-14 RX ADMIN — Medication 100 MILLIGRAM(S): at 13:13

## 2024-07-14 RX ADMIN — Medication 650 MILLIGRAM(S): at 08:52

## 2024-07-14 RX ADMIN — Medication 975 MILLIGRAM(S): at 17:48

## 2024-07-14 RX ADMIN — Medication 3 MILLILITER(S): at 22:00

## 2024-07-14 RX ADMIN — Medication 1 TABLET(S): at 13:13

## 2024-07-14 RX ADMIN — HEPARIN SODIUM 5000 UNIT(S): 50 INJECTION, SOLUTION INTRAVENOUS at 13:13

## 2024-07-14 RX ADMIN — HEPARIN SODIUM 5000 UNIT(S): 50 INJECTION, SOLUTION INTRAVENOUS at 21:55

## 2024-07-14 RX ADMIN — Medication 3 MILLILITER(S): at 14:30

## 2024-07-14 NOTE — PROGRESS NOTE ADULT - SUBJECTIVE AND OBJECTIVE BOX
PGY3 Antepartum Note    Patient seen and examined at bedside in NAD. Her back pain is similar to yesterday. She rates it as a 7/10 in severity. It is mostly on her R flank radiating down to her suprapubic area. Denies any CTX, LOF or VB.  Reports good fetal movement. Denies fever, chills, vomiting, chest pain, SOB, abdominal pain, dysuria.     T(F): 98.5 (05:31)  HR: 94 (05:31)  BP: 101/57 (05:31)  RR: 18 (05:31)    Gen: NAD, standing at bedside and appears comfortable  Cardio: rrr, s1/s2  Pulm: ca b/l  Abd: gravid, nontender, no palpable contractions. Mild suprapubic tenderness.   Back: no CVA tenderness b/l  Ext: no edema,  no calf tenderness  SVE: deferred    Medications:  (ADM OVERRIDE): 1 Each (07-11-24 @ 10:32)  acetaminophen     Tablet ..: 975 milliGRAM(s) (07-13-24 @ 19:33)  acetaminophen   IVPB ..: 400 mL/Hr (07-11-24 @ 23:46)  acetaminophen   IVPB ..: 400 mL/Hr (07-12-24 @ 21:51)  acetaminophen   IVPB ..: 400 mL/Hr (07-12-24 @ 06:13)  acetaminophen   IVPB ..: 400 mL/Hr (07-11-24 @ 14:37)  cefTRIAXone   IVPB: 100 mL/Hr (07-13-24 @ 11:51),  100 mL/Hr (07-12-24 @ 12:34),  100 mL/Hr (07-11-24 @ 11:14)  heparin   Injectable: 5000 Unit(s) (07-13-24 @ 22:45),  5000 Unit(s) (07-13-24 @ 11:51),  5000 Unit(s) (07-12-24 @ 22:11),  5000 Unit(s) (07-12-24 @ 12:35),  5000 Unit(s) (07-11-24 @ 17:59)  lactated ringers Bolus: 1000 mL/Hr (07-11-24 @ 09:48)  lactated ringers.: 150 mL/Hr (07-12-24 @ 19:36),  150 mL/Hr (07-12-24 @ 09:05),  150 mL/Hr (07-11-24 @ 19:37),  150 mL/Hr (07-11-24 @ 11:25),  150 mL/Hr (07-11-24 @ 09:30)  oxyCODONE    IR: 5 milliGRAM(s) (07-12-24 @ 12:34)  polyethylene glycol 3350: 17 Gram(s) (07-13-24 @ 07:50),  17 Gram(s) (07-12-24 @ 17:36)  prenatal multivitamin: 1 Tablet(s) (07-13-24 @ 11:52),  1 Tablet(s) (07-12-24 @ 12:35),  1 Tablet(s) (07-11-24 @ 17:56)  senna: 2 Tablet(s) (07-13-24 @ 23:09)      Labs:                        9.3    7.72  )-----------( 185 ( 13 Jul 2024 22:33 )             30.3

## 2024-07-14 NOTE — PROGRESS NOTE ADULT - ASSESSMENT
A/P: 43yo  at 24w2d admitted with pyelo i/s/o outpatient UCx () +K. pneumoniae with back pain. Pt started on ceftriaxone . Pt states her pain improved with PO Tylenol x1 overnight. She states that this morning her pain is slowly improving.     #Pyelo  - UA (): +nitrites/mod LE, many bacteria  - Kidney/bladder sono (): wnl, no evidence of stone  - Tylenol PRN for pain  - c/w CTX (-)    #Fetal wellbeing  - NST qd  - f/u GBS ()    #Maternal wellbeing  - Regular diet  - HSQ for DVT ppx  - PNV  - UCx (): >100K gram neg rods; f/u sensitivites    Jonh Spangler, PGY-3

## 2024-07-14 NOTE — PROGRESS NOTE ADULT - NSPROGADDITIONALINFOA_GEN_ALL_CORE
45 yo P0 at 24w2d admitted for suspected pyelonephritis, now on HD#4 of IV abx with Ceftriaxone. Pt afebrile. UCx resulted gram negative rods, speciation and susceptibilities pending. Likely Klebsiella, given hx of outpatient Klebsiella UTI. This morning patient with significant extremity edema, likely secondary to fluid overload (x4 days IV fluids). No s/sx pulmonary edema or ARDS, saturating well. Patient also reports continued right sided flank pain, s/p Renal Sono 7/11, low concern for kidney stones and low concern for abscess without continued fevers. N/V improved on Zofran pump. Given continued pain, will continue to monitor inpatient on Ceftriaxone, plan for transition to PO abx when speciation results.   Pt seen and evaluated with KELY Haro Attending  KELY Waller Fellow

## 2024-07-14 NOTE — PROGRESS NOTE ADULT - ATTENDING COMMENTS
KELY ATTENDIN y.o. P0 at 24w2d admitted for suspected pyelonephritis. Patient with outpatient Klebsiella UTI, now admitted with flank pain concerning for pyelonephritis. Now improving symptomatically on CTX. Patient also with significant N/V of pregnancy, on a zofran pump outpatient, prn here inpatient. UCx with GNR which is c/w Klebsiella outpatient culture which was susceptible to CTX, though could be E Coli as well. Hopefull discharge when clinically better and we have speciation, likely tomorrow as patient continues to report significant right sided flank; renal US did not show stone or abscess and WBCs responding appropriately to current regimen.     ALMAZ Dunaway MD
KELY ATTENDIN y.o. P0 at 24w0d admitted for suspected pyelonephritis. Patient with outpatient Klebsiella UTI, now admitted with flank pain concerning for pyelonephritis. Now improving symptomatically on CTX. Patient also with significant N/V of pregnancy, on a zofran pump. UCx have not grown anything thus far, will continue to follow cultures for speciation here, though we have sensitivities from the Klebsiella outpatient culture which was susceptible to CTX. Hopefull discharge when clinically better and we have possible speciation.     ALMAZ Dunaway MD
MFM ATTENDIN y.o. P0 at 24w1d admitted for suspected pyelonephritis. Patient with outpatient Klebsiella UTI, now admitted with flank pain concerning for pyelonephritis. Now improving symptomatically on CTX. Patient also with significant N/V of pregnancy, on a zofran pump. UCx with GNR which is c/w Klebsiella outpatient culture which was susceptible to CTX, though could be E Coli as well. Hopefull discharge when clinically better and we have speciation, likely tomorrow. Pt did require oxy overnight and thus not for d/c today.     ALMAZ Dunaway MD

## 2024-07-15 VITALS
OXYGEN SATURATION: 100 % | RESPIRATION RATE: 17 BRPM | DIASTOLIC BLOOD PRESSURE: 63 MMHG | TEMPERATURE: 98 F | SYSTOLIC BLOOD PRESSURE: 109 MMHG | HEART RATE: 109 BPM

## 2024-07-15 LAB
-  AMOXICILLIN/CLAVULANIC ACID: SIGNIFICANT CHANGE UP
-  AMPICILLIN/SULBACTAM: SIGNIFICANT CHANGE UP
-  AMPICILLIN: SIGNIFICANT CHANGE UP
-  AZTREONAM: SIGNIFICANT CHANGE UP
-  CEFAZOLIN: SIGNIFICANT CHANGE UP
-  CEFEPIME: SIGNIFICANT CHANGE UP
-  CEFOXITIN: SIGNIFICANT CHANGE UP
-  CEFTRIAXONE: SIGNIFICANT CHANGE UP
-  CEFUROXIME: SIGNIFICANT CHANGE UP
-  CIPROFLOXACIN: SIGNIFICANT CHANGE UP
-  ERTAPENEM: SIGNIFICANT CHANGE UP
-  GENTAMICIN: SIGNIFICANT CHANGE UP
-  IMIPENEM: SIGNIFICANT CHANGE UP
-  LEVOFLOXACIN: SIGNIFICANT CHANGE UP
-  MEROPENEM: SIGNIFICANT CHANGE UP
-  NITROFURANTOIN: SIGNIFICANT CHANGE UP
-  PIPERACILLIN/TAZOBACTAM: SIGNIFICANT CHANGE UP
-  TOBRAMYCIN: SIGNIFICANT CHANGE UP
-  TRIMETHOPRIM/SULFAMETHOXAZOLE: SIGNIFICANT CHANGE UP
CULTURE RESULTS: ABNORMAL
CULTURE RESULTS: SIGNIFICANT CHANGE UP
METHOD TYPE: SIGNIFICANT CHANGE UP
ORGANISM # SPEC MICROSCOPIC CNT: ABNORMAL
ORGANISM # SPEC MICROSCOPIC CNT: ABNORMAL
SPECIMEN SOURCE: SIGNIFICANT CHANGE UP
SPECIMEN SOURCE: SIGNIFICANT CHANGE UP

## 2024-07-15 RX ORDER — NITROFURANTOIN MACROCRYSTAL 100 MG
1 CAPSULE ORAL
Qty: 30 | Refills: 0
Start: 2024-07-15 | End: 2024-08-13

## 2024-07-15 RX ORDER — SULFAMETHOXAZOLE AND TRIMETHOPRIM 800; 160 MG/1; MG/1
1 TABLET ORAL
Qty: 20 | Refills: 0
Start: 2024-07-15 | End: 2024-07-24

## 2024-07-15 RX ORDER — SULFAMETHOXAZOLE AND TRIMETHOPRIM 800; 160 MG/1; MG/1
1 TABLET ORAL EVERY 12 HOURS
Refills: 0 | Status: DISCONTINUED | OUTPATIENT
Start: 2024-07-15 | End: 2024-07-15

## 2024-07-15 RX ADMIN — Medication 975 MILLIGRAM(S): at 00:14

## 2024-07-15 RX ADMIN — Medication 3 MILLILITER(S): at 14:12

## 2024-07-15 RX ADMIN — Medication 100 MILLIGRAM(S): at 13:23

## 2024-07-15 RX ADMIN — SULFAMETHOXAZOLE AND TRIMETHOPRIM 1 TABLET(S): 800; 160 TABLET ORAL at 16:23

## 2024-07-15 RX ADMIN — Medication 3 MILLILITER(S): at 20:44

## 2024-07-15 RX ADMIN — Medication 3 MILLILITER(S): at 05:13

## 2024-07-15 RX ADMIN — ONDANSETRON HYDROCHLORIDE 4 MILLIGRAM(S): 2 INJECTION INTRAMUSCULAR; INTRAVENOUS at 11:50

## 2024-07-15 RX ADMIN — Medication 975 MILLIGRAM(S): at 00:45

## 2024-07-15 RX ADMIN — Medication 975 MILLIGRAM(S): at 08:35

## 2024-07-15 RX ADMIN — HEPARIN SODIUM 5000 UNIT(S): 50 INJECTION, SOLUTION INTRAVENOUS at 11:49

## 2024-07-15 RX ADMIN — Medication 975 MILLIGRAM(S): at 09:30

## 2024-07-15 RX ADMIN — Medication 1 TABLET(S): at 11:50

## 2024-07-15 NOTE — PROGRESS NOTE ADULT - SUBJECTIVE AND OBJECTIVE BOX
PGY3 Antepartum Note    Patient seen and examined at bedside in NAD. Her back pain is slightly improved from yesterday. She rates it as a 6/10 in severity. It is mostly on her R flank radiating down to her suprapubic area. Denies any CTX, LOF or VB.  Reports good fetal movement. Denies fever, chills, vomiting, chest pain, SOB, abdominal pain, dysuria.     T(F): 98.3 (05:38)  HR: 97 (05:38)  BP: 104/55 (05:38)  RR: 17 (05:38)    Gen: NAD  Cardio: rrr, s1/s2  Pulm: ca b/l  Abd: gravid, nontender, no palpable contractions  Ext: no edema,  no calf tenderness  SVE: deferred    Medications:  (ADM OVERRIDE): 1 Each (07-11-24 @ 10:32)  acetaminophen     Tablet ..: 975 milliGRAM(s) (07-13-24 @ 19:33)  acetaminophen   IVPB ..: 400 mL/Hr (07-11-24 @ 14:37)  acetaminophen   IVPB ..: 400 mL/Hr (07-12-24 @ 06:13)  acetaminophen   IVPB ..: 400 mL/Hr (07-12-24 @ 21:51)  acetaminophen   IVPB ..: 400 mL/Hr (07-11-24 @ 23:46)  cefTRIAXone   IVPB: 100 mL/Hr (07-14-24 @ 13:13),  100 mL/Hr (07-13-24 @ 11:51),  100 mL/Hr (07-12-24 @ 12:34),  100 mL/Hr (07-11-24 @ 11:14)  heparin   Injectable: 5000 Unit(s) (07-14-24 @ 21:55),  5000 Unit(s) (07-14-24 @ 13:13),  5000 Unit(s) (07-13-24 @ 22:45),  5000 Unit(s) (07-13-24 @ 11:51),  5000 Unit(s) (07-12-24 @ 22:11),  5000 Unit(s) (07-12-24 @ 12:35),  5000 Unit(s) (07-11-24 @ 17:59)  lactated ringers Bolus: 1000 mL/Hr (07-11-24 @ 09:48)  lactated ringers.: 150 mL/Hr (07-12-24 @ 19:36),  150 mL/Hr (07-12-24 @ 09:05),  150 mL/Hr (07-11-24 @ 19:37),  150 mL/Hr (07-11-24 @ 11:25),  150 mL/Hr (07-11-24 @ 09:30)  oxyCODONE    IR: 5 milliGRAM(s) (07-12-24 @ 12:34)  polyethylene glycol 3350: 17 Gram(s) (07-13-24 @ 07:50),  17 Gram(s) (07-12-24 @ 17:36)  prenatal multivitamin: 1 Tablet(s) (07-14-24 @ 13:13),  1 Tablet(s) (07-13-24 @ 11:52),  1 Tablet(s) (07-12-24 @ 12:35),  1 Tablet(s) (07-11-24 @ 17:56)  senna: 2 Tablet(s) (07-13-24 @ 23:09)  sodium chloride 0.9% lock flush: 3 milliLiter(s) (07-15-24 @ 05:13),  3 milliLiter(s) (07-14-24 @ 22:00),  3 milliLiter(s) (07-14-24 @ 14:30)      Labs:                        9.3    7.72  )-----------( 185 ( 13 Jul 2024 22:33 )             30.3

## 2024-07-15 NOTE — PROGRESS NOTE ADULT - NSPROGADDITIONALINFOA_GEN_ALL_CORE
44 y.o. P0 at 24w3d admitted for pyelonephritis w/ Klebsiella. Patient has had persistent back pain prompting ongoing IV treatment. Known Klebsiella UTI with prior sensitivities available but no hospital sensitivities available. Prior renal ultrasound without evidence of stones or abscess. Afebrile with stable vitals and baseline tachycardia. No leukocytosis. Overall reassuring fetal status. This morning AM NST with ***.     - Follow up sensitivities for Klebsiella UTI and transition to PO regimen for discharge  - Given back pain without evidence of CVA tenderness and prolonged duration of IV abx consider secondary etiologies for back pain such as MSK in origin. Could consider symptomatic management with alternative regimens including *** 44 y.o. P0 at 24w3d admitted for pyelonephritis w/ Klebsiella. Patient has had persistent back pain prompting ongoing IV treatment. Known Klebsiella UTI with prior sensitivities available but no hospital sensitivities available. Prior renal ultrasound without evidence of stones or abscess. Afebrile with stable vitals and baseline tachycardia. No leukocytosis. On exam with ongoing CVA tenderness in right side. Overall reassuring fetal status. This morning AM NST with 150, moderate variability, rare variables but overall appropriate for gestational age.     - Follow up sensitivities for Klebsiella UTI and transition to PO regimen for discharge. Continue anti-emetic regimen as needed (has Zofran pump) to be able to tolerate her medications  - Given back pain without evidence of CVA tenderness and prolonged duration of IV abx consider secondary etiologies for back pain such as MSK in origin. Could consider symptomatic management with single dose of Flexeril  - Continue inpatient management pending above    Seen and d/w Dr. Nerissa Lau, MFM Fellow

## 2024-07-15 NOTE — PROGRESS NOTE ADULT - ASSESSMENT
A/P: 45yo  at 24w3d admitted with pyelo i/s/o outpatient UCx () +K. pneumoniae with back pain. Pt started on ceftriaxone . Pt states her pain improved with PO Tylenol x1 overnight. She states that this morning her pain is slowly improving. Inpatient urine culture () growing >100K K. pneumoniae, with sensitivities pending. Pt hemodynamically stable.     #Pyelo  - UA (): +nitrites/mod LE, many bacteria  - Kidney/bladder sono (): wnl, no evidence of stone  - Tylenol PRN for pain  - c/w CTX (-)  - UCx (): >100K K. pneumoniae; f/u sensitivites    #Fetal wellbeing  - NST qd  - GBS neg ()    #Maternal wellbeing  - Regular diet  - HSQ for DVT ppx  - PNV    Jonh Spangler, PGY-3

## 2024-07-27 ENCOUNTER — EMERGENCY (EMERGENCY)
Facility: HOSPITAL | Age: 44
LOS: 1 days | Discharge: STILL ADMITTED OR ADMINISTRATIVE | End: 2024-07-27
Attending: STUDENT IN AN ORGANIZED HEALTH CARE EDUCATION/TRAINING PROGRAM | Admitting: EMERGENCY MEDICINE
Payer: COMMERCIAL

## 2024-07-27 ENCOUNTER — APPOINTMENT (OUTPATIENT)
Dept: ANTEPARTUM | Facility: CLINIC | Age: 44
End: 2024-07-27

## 2024-07-27 ENCOUNTER — INPATIENT (INPATIENT)
Facility: HOSPITAL | Age: 44
LOS: 1 days | Discharge: HOME | End: 2024-07-29
Attending: SPECIALIST | Admitting: SPECIALIST
Payer: COMMERCIAL

## 2024-07-27 VITALS
TEMPERATURE: 99 F | RESPIRATION RATE: 16 BRPM | DIASTOLIC BLOOD PRESSURE: 55 MMHG | HEART RATE: 84 BPM | SYSTOLIC BLOOD PRESSURE: 99 MMHG

## 2024-07-27 VITALS
HEART RATE: 112 BPM | SYSTOLIC BLOOD PRESSURE: 113 MMHG | HEIGHT: 63 IN | WEIGHT: 184.97 LBS | RESPIRATION RATE: 18 BRPM | TEMPERATURE: 98 F | OXYGEN SATURATION: 100 % | DIASTOLIC BLOOD PRESSURE: 66 MMHG

## 2024-07-27 DIAGNOSIS — Z90.6 ACQUIRED ABSENCE OF OTHER PARTS OF URINARY TRACT: Chronic | ICD-10-CM

## 2024-07-27 DIAGNOSIS — Z98.890 OTHER SPECIFIED POSTPROCEDURAL STATES: Chronic | ICD-10-CM

## 2024-07-27 DIAGNOSIS — O26.899 OTHER SPECIFIED PREGNANCY RELATED CONDITIONS, UNSPECIFIED TRIMESTER: ICD-10-CM

## 2024-07-27 DIAGNOSIS — O41.00X0 OLIGOHYDRAMNIOS, UNSPECIFIED TRIMESTER, NOT APPLICABLE OR UNSPECIFIED: ICD-10-CM

## 2024-07-27 LAB
ALBUMIN SERPL ELPH-MCNC: 3.3 G/DL — SIGNIFICANT CHANGE UP (ref 3.3–5)
ALP SERPL-CCNC: 93 U/L — SIGNIFICANT CHANGE UP (ref 40–120)
ALT FLD-CCNC: 10 U/L — SIGNIFICANT CHANGE UP (ref 4–33)
ANION GAP SERPL CALC-SCNC: 11 MMOL/L — SIGNIFICANT CHANGE UP (ref 7–14)
ANISOCYTOSIS BLD QL: SLIGHT — SIGNIFICANT CHANGE UP
APPEARANCE UR: ABNORMAL
AST SERPL-CCNC: 15 U/L — SIGNIFICANT CHANGE UP (ref 4–32)
BACTERIA # UR AUTO: ABNORMAL /HPF
BASOPHILS # BLD AUTO: 0 K/UL — SIGNIFICANT CHANGE UP (ref 0–0.2)
BASOPHILS NFR BLD AUTO: 0 % — SIGNIFICANT CHANGE UP (ref 0–2)
BILIRUB SERPL-MCNC: 0.2 MG/DL — SIGNIFICANT CHANGE UP (ref 0.2–1.2)
BILIRUB UR-MCNC: NEGATIVE — SIGNIFICANT CHANGE UP
BLD GP AB SCN SERPL QL: NEGATIVE — SIGNIFICANT CHANGE UP
BUN SERPL-MCNC: 4 MG/DL — LOW (ref 7–23)
CALCIUM SERPL-MCNC: 10.4 MG/DL — SIGNIFICANT CHANGE UP (ref 8.4–10.5)
CAST: 1 /LPF — SIGNIFICANT CHANGE UP (ref 0–4)
CHLORIDE SERPL-SCNC: 103 MMOL/L — SIGNIFICANT CHANGE UP (ref 98–107)
CO2 SERPL-SCNC: 21 MMOL/L — LOW (ref 22–31)
COD CRY URNS QL: PRESENT
COLOR SPEC: YELLOW — SIGNIFICANT CHANGE UP
CREAT SERPL-MCNC: 0.53 MG/DL — SIGNIFICANT CHANGE UP (ref 0.5–1.3)
DIFF PNL FLD: ABNORMAL
EGFR: 118 ML/MIN/1.73M2 — SIGNIFICANT CHANGE UP
EOSINOPHIL # BLD AUTO: 0.23 K/UL — SIGNIFICANT CHANGE UP (ref 0–0.5)
EOSINOPHIL NFR BLD AUTO: 2.6 % — SIGNIFICANT CHANGE UP (ref 0–6)
GIANT PLATELETS BLD QL SMEAR: PRESENT — SIGNIFICANT CHANGE UP
GLUCOSE SERPL-MCNC: 95 MG/DL — SIGNIFICANT CHANGE UP (ref 70–99)
GLUCOSE UR QL: NEGATIVE MG/DL — SIGNIFICANT CHANGE UP
HCT VFR BLD CALC: 34.2 % — LOW (ref 34.5–45)
HGB BLD-MCNC: 10.7 G/DL — LOW (ref 11.5–15.5)
IANC: 6.61 K/UL — SIGNIFICANT CHANGE UP (ref 1.8–7.4)
KETONES UR-MCNC: NEGATIVE MG/DL — SIGNIFICANT CHANGE UP
LEUKOCYTE ESTERASE UR-ACNC: ABNORMAL
LIDOCAIN IGE QN: 24 U/L — SIGNIFICANT CHANGE UP (ref 7–60)
LYMPHOCYTES # BLD AUTO: 1.16 K/UL — SIGNIFICANT CHANGE UP (ref 1–3.3)
LYMPHOCYTES # BLD AUTO: 13.3 % — SIGNIFICANT CHANGE UP (ref 13–44)
MCHC RBC-ENTMCNC: 27 PG — SIGNIFICANT CHANGE UP (ref 27–34)
MCHC RBC-ENTMCNC: 31.3 GM/DL — LOW (ref 32–36)
MCV RBC AUTO: 86.1 FL — SIGNIFICANT CHANGE UP (ref 80–100)
METAMYELOCYTES # FLD: 1.8 % — HIGH (ref 0–1)
MONOCYTES # BLD AUTO: 0.7 K/UL — SIGNIFICANT CHANGE UP (ref 0–0.9)
MONOCYTES NFR BLD AUTO: 8 % — SIGNIFICANT CHANGE UP (ref 2–14)
MYELOCYTES NFR BLD: 1.8 % — HIGH (ref 0–0)
NEUTROPHILS # BLD AUTO: 6.32 K/UL — SIGNIFICANT CHANGE UP (ref 1.8–7.4)
NEUTROPHILS NFR BLD AUTO: 69.9 % — SIGNIFICANT CHANGE UP (ref 43–77)
NEUTS BAND # BLD: 2.6 % — SIGNIFICANT CHANGE UP (ref 0–6)
NITRITE UR-MCNC: NEGATIVE — SIGNIFICANT CHANGE UP
PH UR: 6.5 — SIGNIFICANT CHANGE UP (ref 5–8)
PLAT MORPH BLD: NORMAL — SIGNIFICANT CHANGE UP
PLATELET # BLD AUTO: 209 K/UL — SIGNIFICANT CHANGE UP (ref 150–400)
PLATELET COUNT - ESTIMATE: NORMAL — SIGNIFICANT CHANGE UP
POLYCHROMASIA BLD QL SMEAR: SLIGHT — SIGNIFICANT CHANGE UP
POTASSIUM SERPL-MCNC: 4 MMOL/L — SIGNIFICANT CHANGE UP (ref 3.5–5.3)
POTASSIUM SERPL-SCNC: 4 MMOL/L — SIGNIFICANT CHANGE UP (ref 3.5–5.3)
PROT SERPL-MCNC: 6.6 G/DL — SIGNIFICANT CHANGE UP (ref 6–8.3)
PROT UR-MCNC: NEGATIVE MG/DL — SIGNIFICANT CHANGE UP
RBC # BLD: 3.97 M/UL — SIGNIFICANT CHANGE UP (ref 3.8–5.2)
RBC # FLD: 15.2 % — HIGH (ref 10.3–14.5)
RBC BLD AUTO: ABNORMAL
RBC CASTS # UR COMP ASSIST: 27 /HPF — HIGH (ref 0–4)
REVIEW: SIGNIFICANT CHANGE UP
RH IG SCN BLD-IMP: POSITIVE — SIGNIFICANT CHANGE UP
SODIUM SERPL-SCNC: 135 MMOL/L — SIGNIFICANT CHANGE UP (ref 135–145)
SP GR SPEC: 1.01 — SIGNIFICANT CHANGE UP (ref 1–1.03)
SQUAMOUS # UR AUTO: 5 /HPF — SIGNIFICANT CHANGE UP (ref 0–5)
UROBILINOGEN FLD QL: 1 MG/DL — SIGNIFICANT CHANGE UP (ref 0.2–1)
WBC # BLD: 8.72 K/UL — SIGNIFICANT CHANGE UP (ref 3.8–10.5)
WBC # FLD AUTO: 8.72 K/UL — SIGNIFICANT CHANGE UP (ref 3.8–10.5)
WBC UR QL: 23 /HPF — HIGH (ref 0–5)

## 2024-07-27 PROCEDURE — 76770 US EXAM ABDO BACK WALL COMP: CPT | Mod: 26

## 2024-07-27 PROCEDURE — 93010 ELECTROCARDIOGRAM REPORT: CPT

## 2024-07-27 PROCEDURE — 99285 EMERGENCY DEPT VISIT HI MDM: CPT

## 2024-07-27 RX ORDER — LIDOCAINE HCL 28 MG/G
1 GEL TOPICAL ONCE
Refills: 0 | Status: COMPLETED | OUTPATIENT
Start: 2024-07-27 | End: 2024-07-27

## 2024-07-27 RX ORDER — DEXTROSE MONOHYDRATE, SODIUM CHLORIDE, SODIUM LACTATE, CALCIUM CHLORIDE, MAGNESIUM CHLORIDE 1.5; 538; 448; 18.4; 5.08 G/100ML; MG/100ML; MG/100ML; MG/100ML; MG/100ML
1000 SOLUTION INTRAPERITONEAL
Refills: 0 | Status: DISCONTINUED | OUTPATIENT
Start: 2024-07-27 | End: 2024-07-29

## 2024-07-27 RX ORDER — PRENATAL VIT/IRON FUM/FOLIC AC 60 MG-1 MG
1 TABLET ORAL
Qty: 0 | Refills: 0 | DISCHARGE

## 2024-07-27 RX ORDER — SODIUM CHLORIDE 0.9 % (FLUSH) 0.9 %
1000 SYRINGE (ML) INJECTION ONCE
Refills: 0 | Status: COMPLETED | OUTPATIENT
Start: 2024-07-27 | End: 2024-07-27

## 2024-07-27 RX ORDER — ACETAMINOPHEN 500 MG
1000 TABLET ORAL ONCE
Refills: 0 | Status: COMPLETED | OUTPATIENT
Start: 2024-07-27 | End: 2024-07-27

## 2024-07-27 RX ORDER — ACETAMINOPHEN 325 MG
1000 TABLET ORAL ONCE
Refills: 0 | Status: COMPLETED | OUTPATIENT
Start: 2024-07-27 | End: 2024-07-27

## 2024-07-27 RX ORDER — NITROFURANTOIN (MACROCRYSTALS) 50 MG/1
1 CAPSULE ORAL
Refills: 0 | DISCHARGE

## 2024-07-27 RX ORDER — TAMSULOSIN HCL 0.4 MG
0.4 CAPSULE ORAL AT BEDTIME
Refills: 0 | Status: DISCONTINUED | OUTPATIENT
Start: 2024-07-27 | End: 2024-07-27

## 2024-07-27 RX ORDER — ONDANSETRON HYDROCHLORIDE 2 MG/ML
4 INJECTION INTRAMUSCULAR; INTRAVENOUS ONCE
Refills: 0 | Status: COMPLETED | OUTPATIENT
Start: 2024-07-27 | End: 2024-07-27

## 2024-07-27 RX ORDER — ONDANSETRON HCL/PF 4 MG/2 ML
4 VIAL (ML) INJECTION EVERY 6 HOURS
Refills: 0 | Status: DISCONTINUED | OUTPATIENT
Start: 2024-07-27 | End: 2024-07-29

## 2024-07-27 RX ORDER — TAMSULOSIN HCL 0.4 MG
0.4 CAPSULE ORAL DAILY
Refills: 0 | Status: DISCONTINUED | OUTPATIENT
Start: 2024-07-27 | End: 2024-07-29

## 2024-07-27 RX ORDER — NITROFURANTOIN (MACROCRYSTALS) 50 MG/1
10 CAPSULE ORAL
Refills: 0 | DISCHARGE

## 2024-07-27 RX ADMIN — Medication 1000 MILLILITER(S): at 05:05

## 2024-07-27 RX ADMIN — Medication 400 MILLIGRAM(S): at 18:10

## 2024-07-27 RX ADMIN — LIDOCAINE HCL 1 PATCH: 28 GEL TOPICAL at 05:05

## 2024-07-27 RX ADMIN — DEXTROSE MONOHYDRATE, SODIUM CHLORIDE, SODIUM LACTATE, CALCIUM CHLORIDE, MAGNESIUM CHLORIDE 125 MILLILITER(S): 1.5; 538; 448; 18.4; 5.08 SOLUTION INTRAPERITONEAL at 07:47

## 2024-07-27 RX ADMIN — Medication 100 MILLIGRAM(S): at 10:05

## 2024-07-27 RX ADMIN — Medication 400 MILLIGRAM(S): at 05:05

## 2024-07-27 RX ADMIN — Medication 1000 MILLIGRAM(S): at 18:51

## 2024-07-27 RX ADMIN — Medication 0.4 MILLIGRAM(S): at 14:05

## 2024-07-27 RX ADMIN — Medication 4 MILLIGRAM(S): at 18:03

## 2024-07-27 RX ADMIN — ONDANSETRON HYDROCHLORIDE 4 MILLIGRAM(S): 2 INJECTION INTRAMUSCULAR; INTRAVENOUS at 05:05

## 2024-07-27 NOTE — ED ADULT TRIAGE NOTE - CHIEF COMPLAINT QUOTE
Pt c/o right flank pain radiating to lower back x 2 wks. States was admitted in the hospital for pyelonephritis, discharged 11 days ago. Pt had pain on left flank during that time. Pt 26 wks pregnant, MITALI , . Hx hyperemesis gravidum

## 2024-07-27 NOTE — OB PROVIDER H&P - ASSESSMENT
45yo  @26w1d presenting with R sided flank pain. Patient afebrile, no leukocytosis. UA significant for small LE, neg nitrite, +calcium oxalate cystals. Differential includes nephrolithiasis vs. pyelonephritis.  - f/u renal ultrasound  - LR@125    d/w Dr. Gorge Aguilar, PGY3    Signed out to day team at 07:26  ________________________________________  -Received hand-off from night shift team  -Renal ultrasound completed. Patient states flank pain is subsiding, now 5/10 on pain scale.    09:30  -Ultrasound result negative for calculi or hydronephrosis.  -Plan discussed with Dr. Moreno and Dr. Brown. Due to previous incomplete treatment for pyelonephritis, patient is to be admitted for IV antibiotics and will transition to be antibiotic treatment after urine culture is resulted. VE performed per Dr. Brown, 0/0/-3.  -Admit to antepartum  -Routine labs ordered  -IV fluids to continue  -regular diet  -NST BID  -Flomax ordered due to calcium oxylate crystals noted in UA. Urine to be strained for calculi.  -Blood transfusion and induction/labor consents obtained. Risks, benefits, alternatives and possible complications have been discussed in detail with the patient in her native language. Pre-admission, admission, and post admission procedures and expectations were discussed in detail. All questions answered, all appropriate hospital consents were signed. Anticipate normal vaginal delivery. Informed Consent was obtained. The following was discussed:     Induction/Augmentation of Labor: Use of medication and/or cook balloon to begin or enhance labor  Obstetrical management including internal fetal/contraction monitoring  Normal vaginal delivery  Possible  Section: (surgical cut in the abdomen in order to deliver the baby)    d/w Dr. Moreno and Dr. Spangler PGY-3  Minerva Fraser [facility]

## 2024-07-27 NOTE — ED ADULT NURSE NOTE - OBJECTIVE STATEMENT
45 y/o F presents to ED room 13 A&Ox4 c/o flank pain radiating to lower back x 2 wks. States was admitted in the hospital for pyelonephritis, discharged 11 days ago. endorses N/V and back pain. respirations even and unlabored. no acute distress noted. 20G to LAC, labs drawn and sent. medicated as per orders. awaiting transfer to L&D as per MD Hinojosa

## 2024-07-27 NOTE — OB PROVIDER TRIAGE NOTE - NSHPLABSRESULTS_GEN_ALL_CORE
10.7   8.72  )-----------( 209      (  @ 04:56 )             34.2      @ 04:56    135  |  103  |  4   ----------------------------<  95  4.0   |  21  |  0.53    LIVER FUNCTIONS - ( 2024 04:56 )  Alb: 3.3 g/dL / Pro: 6.6 g/dL / ALK PHOS: 93 U/L / ALT: 10 U/L / AST: 15 U/L / GGT: x           Urinalysis Basic - ( 2024 04:56 )    Color: Yellow / Appearance: Cloudy / S.014 / pH: x  Gluc: 95 mg/dL / Ketone: Negative mg/dL  / Bili: Negative / Urobili: 1.0 mg/dL   Blood: x / Protein: Negative mg/dL / Nitrite: Negative   Leuk Esterase: Small / RBC: 27 /HPF / WBC 23 /HPF   Sq Epi: x / Non Sq Epi: 5 /HPF / Bacteria: Moderate /HPF

## 2024-07-27 NOTE — ED PROVIDER NOTE - PROGRESS NOTE DETAILS
Mark KNOTT), PGY-2: Spoke with gynecology team, discussed patient history and clinical presentation, they want patient transferred up to labor and delivery triage, they will complete patient's workup and disposition from labor and delivery.

## 2024-07-27 NOTE — OB PROVIDER TRIAGE NOTE - NSICDXPASTSURGICALHX_GEN_ALL_CORE_FT
Called patient to notify her of d/c status as it has been over 30 days since last visit and she cancelled/no showed for remaining visits.  Patient was informed to get a new referral when she is ready to resume therapy and contact clinic with any questions she may have.     Milly England, PT, DPT  3/27/2020    
PAST SURGICAL HISTORY:  History of dilatation and curettage

## 2024-07-27 NOTE — ED PROVIDER NOTE - CLINICAL SUMMARY MEDICAL DECISION MAKING FREE TEXT BOX
This is a 44-year-old woman, 26 weeks pregnant, recent admission for pyelonephritis 2 weeks ago, presenting now for persistent flank pain, initially left-sided flank pain, now 2 weeks of right-sided flank pain, treated with ceftriaxone during admission, sent home with Bactrim, switch to Macrobid 7 days ago, patient well-appearing in the ED, abdomen soft nontender, normal fetal movements per patient, right CVA tenderness, no overlying skin changes, patient likely with recurrent pyelonephritis, possible kidney stone, less likely musculoskeletal type pain, will check basic blood work, repeat urine, ultrasound kidneys, treat symptomatically, IV fluids for rehydration, consult gynecology given recent admission to their service, follow-up results and reassess.  Patient possibly to need admission for IV antibiotics.

## 2024-07-27 NOTE — OB RN PATIENT PROFILE - WEIGHT IN KG
Phone: [phone] Fax: [phone]    Occupational Therapy   Cancellation/No-show Note     Patient Name:  Sharita Sharma  : 1977  Date:  3/27/2024  MRN: [id number]    For today's appointment patient:       Total missed visits including today: 8       Total number of no shows: 2    [x]  Cancelled   []  Rescheduled appointment   []  No-show     Reason given by patient:   []  Patient ill   []  Conflicting appointment   []  No transportation   [x]  Conflict with work   []  No reason given   []  Other:     Comments: Pt cancelled due to getting called into work    Electronically signed by: LEDY Israel, OTR/L [id number]       
87.1

## 2024-07-27 NOTE — OB RN PATIENT PROFILE - MATERNAL MARITAL STATUS, OB PROFILE
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  Exhausted with 2 toddlers.  Sons with croup and strep this fall already.  Reviewed normal genetic testing- gender unknown.  No bleeding or cramping.    
partnered

## 2024-07-27 NOTE — ED ADULT TRIAGE NOTE - HEART RATE (BEATS/MIN)
112 Erythromycin Counseling:  I discussed with the patient the risks of erythromycin including but not limited to GI upset, allergic reaction, drug rash, diarrhea, increase in liver enzymes, and yeast infections.

## 2024-07-27 NOTE — OB PROVIDER TRIAGE NOTE - HISTORY OF PRESENT ILLNESS
45yo  @26w1d presenting with R sided flank pain. Patient was admitted on  for pyelonephritis with L CVA tenderness s/p CTX. UCx grew pan-sensitive Klebsiella. Pain then transitioned to R sided flank pain on HD#4, team considered MSK etiology of back pain as patient had no CVA tenderness at this time. Patient was discharged with a 10 day course of Bactrim. Patients primary OB discontinued Bactrim on  due to concern for birth defects (patient was supposed to continue until ). Patient is on Macrobid for suppression since . Patient reports continued R sided flank pain that has progressively worsened since discharge. It is now 7/10 in severity and radiates from R flank to lower back and RLQ. Pain is worse with movement.    PNC:  Dr Mtz of [facility]  - Pyelonephritis (see above)  - Hyperemesis gravidarum, on Zofran pump  GBS: neg    OB: TOP w/ D&C x3  GYN: PCOS  PMH: denies  PSH: D&C, L thigh cystectomy  Meds: Macribid, Zofran, PNV  All: NKDA  SH: -T/E/D  Psych: denies

## 2024-07-27 NOTE — ED PROVIDER NOTE - PHYSICAL EXAMINATION
Const: Awake, alert, no acute distress.  Well appearing.  Moving comfortably on stretcher.  HEENT: NC/AT.  Moist mucous membranes.  No pharyngeal erythema, no exudates.  Eyes: Extraocular movements intact b/l.  Pupils equal, round, and reactive to light b/l.  Conjunctiva pink.  No scleral icterus.  Neck: Neck supple, full ROM without pain.  Cardiac: Regular rate and regular rhythm. S1 S2 present.  Peripheral pulses 2+ and symmetric.  No LE edema.  No chest wall tenderness, no overlying skin changes.  Resp: Speaking in full sentences. No evidence of respiratory distress.  Good air entry b/l, breath sounds clear to auscultation b/l.  Abd: no overlying skin changes.  Soft, non-tender, no guarding, no rigidity, no rebound tenderness.  No palpable masses.  MSK: Spine midline and non-tender, no paraspinal tenderness, no palpable deformities or overlying skin changes or open wounds. (+) R CVAT.  Skin: Normal coloration.  No rashes, abrasions or lacerations.  Neuro: Awake, alert & oriented x 3.  Moves all extremities spontaneously and symmetrically.  No focal deficits.

## 2024-07-27 NOTE — OB PROVIDER TRIAGE NOTE - NSPREVIOUSSPONTANEOUSTERMINATIONSLESS20_OBGYN_ALL_OB_NU
For information on Fall & Injury Prevention, visit: [email or web address] OR  [email or web address] OR  https://www.cdc.gov/steadi/patient.html 0

## 2024-07-27 NOTE — OB RN PATIENT PROFILE - FALL HARM RISK - UNIVERSAL INTERVENTIONS
Bed in lowest position, wheels locked, appropriate side rails in place/Call bell, personal items and telephone in reach/Instruct patient to call for assistance before getting out of bed or chair/Non-slip footwear when patient is out of bed/[place] to call system/Physically safe environment - no spills, clutter or unnecessary equipment/Purposeful Proactive Rounding/Room/bathroom lighting operational, light cord in reach

## 2024-07-27 NOTE — OB PROVIDER TRIAGE NOTE - NSHPPHYSICALEXAM_GEN_ALL_CORE
Vital Signs Last 24 Hrs  T(C): 36.6 (27 Jul 2024 03:01), Max: 36.6 (27 Jul 2024 03:01)  T(F): 97.9 (27 Jul 2024 03:01), Max: 97.9 (27 Jul 2024 03:01)  HR: 88 (27 Jul 2024 06:32) (88 - 112)  BP: 111/61 (27 Jul 2024 06:32) (111/61 - 113/66)  BP(mean): --  RR: 18 (27 Jul 2024 03:01) (18 - 18)  SpO2: 100% (27 Jul 2024 03:01) (100% - 100%)    Parameters below as of 27 Jul 2024 03:01  Patient On (Oxygen Delivery Method): room air

## 2024-07-27 NOTE — ED PROVIDER NOTE - OBJECTIVE STATEMENT
44-year-old F patient history PCOS, currently 26 weeks pregnant, G4, P0, history D&C x 3, LMP 1/26, presenting to the ED for evaluation of right-sided flank pain.  Patient was admitted to the hospital 7/11 for pyelonephritis, initally with L-sided flank pain, tx with IV ceftriaxone, discharged home 7/15 on Bactrim, was switched to Macrobid 7/20 by home gynecologist.  Patient notes right-sided flank pain beginning during her hospital admission, progressively worsening since then, constant, radiating down her lower back, 7/10 in intensity.  Last Tylenol dosage 2 days ago, minimal improvement.  Patient is feeling normal fetal movement today.  Patient has Zofran pump for hyperemesis gravidarum, used it during the day today, disconnected pump at approximately 7 PM.  Denies fevers, chills, chest pain, shortness of breath, headaches, numbness, tingling, LOC, abdominal pain, dysuria, hematuria, urinary frequency, vaginal bleeding, cough, throat pain, nasal congestion.

## 2024-07-27 NOTE — ED PROVIDER NOTE - ATTENDING CONTRIBUTION TO CARE
I have discussed the patient's case presentation with resident. I have also personally performed a face-to-face evaluation of the patient. I agree with the resident's assessment and plan.    Patient is well-appearing but has right CVAT. Shortly after our ED evaluation, OB spoke to us and asked us to transfer the patient to L&D so they can continue/complete the workup and treatment.

## 2024-07-27 NOTE — OB PROVIDER H&P - HISTORY OF PRESENT ILLNESS
45yo  @26w1d presenting with R sided flank pain. Patient was admitted on  for pyelonephritis with L CVA tenderness s/p CTX. UCx grew pan-sensitive Klebsiella. Pain then transitioned to R sided flank pain on HD#4, team considered MSK etiology of back pain as patient had no CVA tenderness at this time. Patient was discharged with a 10 day course of Bactrim. Patients primary OB discontinued Bactrim on  due to concern for birth defects (patient was supposed to continue until ). Patient is on Macrobid for suppression since . Patient reports continued R sided flank pain that has progressively worsened since discharge. It is now 7/10 in severity and radiates from R flank to lower back and RLQ. Pain is worse with movement.    PNC:  Dr Mtz of [facility]  - Pyelonephritis (see above)  - Hyperemesis gravidarum, on Zofran pump  GBS: neg    OB: TOP w/ D&C x3  GYN: PCOS  PMH: denies  PSH: D&C, L thigh cystectomy  Meds: Macribid, Zofran, PNV  All: NKDA  SH: -T/E/D  Psych: denies

## 2024-07-27 NOTE — OB PROVIDER TRIAGE NOTE - HISTORY OF PRESENT ILLNESS
45yo  @26w1d presenting with R sided flank pain. Patient was admitted on  for pyelonephritis with L CVA tenderness s/p CTX. UCx grew pan-sensitive Klebsiella. Pain then transitioned to R sided flank pain on HD#4, team considered MSK etiology of back pain as patient had no CVA tenderness at this time. Patient was discharged with a 10 day course of Bactrim. Patient is on Macrobid for suppression since     PNC:  Dr Mtz of [facility]  - Pyelonephritis (see above)  - Hyperemesis gravidarum, on Zofran pump  GBS: neg    OB: TOP w/ D&C x3  GYN: PCOS  PMH: denies  PSH: D&C, L thigh cystectomy  Meds: Macribid, Zofran, PNV  All: NKDA  SH: -T/E/D  Psych: denies

## 2024-07-27 NOTE — OB PROVIDER H&P - NSLOWPPHRISK_OBGYN_A_OB
No previous uterine incision/[place] Pregnancy/Less than or equal to 4 previous vaginal births/No known bleeding disorder/No history of postpartum hemorrhage

## 2024-07-27 NOTE — OB RN TRIAGE NOTE - FALL HARM RISK - UNIVERSAL INTERVENTIONS
Bed in lowest position, wheels locked, appropriate side rails in place/Call bell, personal items and telephone in reach/Instruct patient to call for assistance before getting out of bed or chair/Non-slip footwear when patient is out of bed/[place] to call system/Physically safe environment - no spills, clutter or unnecessary equipment/Purposeful Proactive Rounding/Room/bathroom lighting operational, light cord in reach

## 2024-07-27 NOTE — ED ADULT NURSE NOTE - CHIEF COMPLAINT
The patient is a 44y Female complaining of  Tetracycline Counseling: Patient counseled regarding possible photosensitivity and increased risk for sunburn.  Patient instructed to avoid sunlight, if possible.  When exposed to sunlight, patients should wear protective clothing, sunglasses, and sunscreen.  The patient was instructed to call the office immediately if the following severe adverse effects occur:  hearing changes, easy bruising/bleeding, severe headache, or vision changes.  The patient verbalized understanding of the proper use and possible adverse effects of tetracycline.  All of the patient's questions and concerns were addressed. Patient understands to avoid pregnancy while on therapy due to potential birth defects. Minocycline Pregnancy And Lactation Text: This medication is Pregnancy Category D and not consider safe during pregnancy. It is also excreted in breast milk. Topical Sulfur Applications Counseling: Topical Sulfur Counseling: Patient counseled that this medication may cause skin irritation or allergic reactions.  In the event of skin irritation, the patient was advised to reduce the amount of the drug applied or use it less frequently.   The patient verbalized understanding of the proper use and possible adverse effects of topical sulfur application.  All of the patient's questions and concerns were addressed. Benzoyl Peroxide Counseling: Patient counseled that medicine may cause skin irritation and bleach clothing.  In the event of skin irritation, the patient was advised to reduce the amount of the drug applied or use it less frequently.   The patient verbalized understanding of the proper use and possible adverse effects of benzoyl peroxide.  All of the patient's questions and concerns were addressed. Topical Clindamycin Counseling: Patient counseled that this medication may cause skin irritation or allergic reactions.  In the event of skin irritation, the patient was advised to reduce the amount of the drug applied or use it less frequently.   The patient verbalized understanding of the proper use and possible adverse effects of clindamycin.  All of the patient's questions and concerns were addressed. Spironolactone Counseling: Patient advised regarding risks of diarrhea, abdominal pain, hyperkalemia, birth defects (for female patients), liver toxicity and renal toxicity. The patient may need blood work to monitor liver and kidney function and potassium levels while on therapy. The patient verbalized understanding of the proper use and possible adverse effects of spironolactone.  All of the patient's questions and concerns were addressed. Use Enhanced Medication Counseling?: No Topical Clindamycin Pregnancy And Lactation Text: This medication is Pregnancy Category B and is considered safe during pregnancy. It is unknown if it is excreted in breast milk. Aklief Pregnancy And Lactation Text: It is unknown if this medication is safe to use during pregnancy.  It is unknown if this medication is excreted in breast milk.  Breastfeeding women should use the topical cream on the smallest area of the skin for the shortest time needed while breastfeeding.  Do not apply to nipple and areola. Tazorac Pregnancy And Lactation Text: This medication is not safe during pregnancy. It is unknown if this medication is excreted in breast milk. Winlevi Counseling:  I discussed with the patient the risks of topical clascoterone including but not limited to erythema, scaling, itching, and stinging. Patient voiced their understanding. High Dose Vitamin A Counseling: Side effects reviewed, pt to contact office should one occur. Doxycycline Pregnancy And Lactation Text: This medication is Pregnancy Category D and not consider safe during pregnancy. It is also excreted in breast milk but is considered safe for shorter treatment courses. Spironolactone Pregnancy And Lactation Text: This medication can cause feminization of the male fetus and should be avoided during pregnancy. The active metabolite is also found in breast milk. Benzoyl Peroxide Pregnancy And Lactation Text: This medication is Pregnancy Category C. It is unknown if benzoyl peroxide is excreted in breast milk. Winlevi Pregnancy And Lactation Text: This medication is considered safe during pregnancy and breastfeeding. Dapsone Counseling: I discussed with the patient the risks of dapsone including but not limited to hemolytic anemia, agranulocytosis, rashes, methemoglobinemia, kidney failure, peripheral neuropathy, headaches, GI upset, and liver toxicity.  Patients who start dapsone require monitoring including baseline LFTs and weekly CBCs for the first month, then every month thereafter.  The patient verbalized understanding of the proper use and possible adverse effects of dapsone.  All of the patient's questions and concerns were addressed. Isotretinoin Pregnancy And Lactation Text: This medication is Pregnancy Category X and is considered extremely dangerous during pregnancy. It is unknown if it is excreted in breast milk. Topical Retinoid counseling:  Patient advised to apply a pea-sized amount only at bedtime and wait 30 minutes after washing their face before applying.  If too drying, patient may add a non-comedogenic moisturizer. The patient verbalized understanding of the proper use and possible adverse effects of retinoids.  All of the patient's questions and concerns were addressed. Topical Sulfur Applications Pregnancy And Lactation Text: This medication is Pregnancy Category C and has an unknown safety profile during pregnancy. It is unknown if this topical medication is excreted in breast milk. Azelaic Acid Pregnancy And Lactation Text: This medication is considered safe during pregnancy and breast feeding. Erythromycin Counseling:  I discussed with the patient the risks of erythromycin including but not limited to GI upset, allergic reaction, drug rash, diarrhea, increase in liver enzymes, and yeast infections. Dapsone Pregnancy And Lactation Text: This medication is Pregnancy Category C and is not considered safe during pregnancy or breast feeding. Azithromycin Counseling:  I discussed with the patient the risks of azithromycin including but not limited to GI upset, allergic reaction, drug rash, diarrhea, and yeast infections. Birth Control Pills Counseling: Birth Control Pill Counseling: I discussed with the patient the potential side effects of OCPs including but not limited to increased risk of stroke, heart attack, thrombophlebitis, deep venous thrombosis, hepatic adenomas, breast changes, GI upset, headaches, and depression.  The patient verbalized understanding of the proper use and possible adverse effects of OCPs. All of the patient's questions and concerns were addressed. Topical Retinoid Pregnancy And Lactation Text: This medication is Pregnancy Category C. It is unknown if this medication is excreted in breast milk. Azithromycin Pregnancy And Lactation Text: This medication is considered safe during pregnancy and is also secreted in breast milk. Bactrim Counseling:  I discussed with the patient the risks of sulfa antibiotics including but not limited to GI upset, allergic reaction, drug rash, diarrhea, dizziness, photosensitivity, and yeast infections.  Rarely, more serious reactions can occur including but not limited to aplastic anemia, agranulocytosis, methemoglobinemia, blood dyscrasias, liver or kidney failure, lung infiltrates or desquamative/blistering drug rashes. Minocycline Counseling: Patient advised regarding possible photosensitivity and discoloration of the teeth, skin, lips, tongue and gums.  Patient instructed to avoid sunlight, if possible.  When exposed to sunlight, patients should wear protective clothing, sunglasses, and sunscreen.  The patient was instructed to call the office immediately if the following severe adverse effects occur:  hearing changes, easy bruising/bleeding, severe headache, or vision changes.  The patient verbalized understanding of the proper use and possible adverse effects of minocycline.  All of the patient's questions and concerns were addressed. Tazorac Counseling:  Patient advised that medication is irritating and drying.  Patient may need to apply sparingly and wash off after an hour before eventually leaving it on overnight.  The patient verbalized understanding of the proper use and possible adverse effects of tazorac.  All of the patient's questions and concerns were addressed. Doxycycline Counseling:  Patient counseled regarding possible photosensitivity and increased risk for sunburn.  Patient instructed to avoid sunlight, if possible.  When exposed to sunlight, patients should wear protective clothing, sunglasses, and sunscreen.  The patient was instructed to call the office immediately if the following severe adverse effects occur:  hearing changes, easy bruising/bleeding, severe headache, or vision changes.  The patient verbalized understanding of the proper use and possible adverse effects of doxycycline.  All of the patient's questions and concerns were addressed. Birth Control Pills Pregnancy And Lactation Text: This medication should be avoided if pregnant and for the first 30 days post-partum. High Dose Vitamin A Pregnancy And Lactation Text: High dose vitamin A therapy is contraindicated during pregnancy and breast feeding. Aklief counseling:  Patient advised to apply a pea-sized amount only at bedtime and wait 30 minutes after washing their face before applying.  If too drying, patient may add a non-comedogenic moisturizer.  The most commonly reported side effects including irritation, redness, scaling, dryness, stinging, burning, itching, and increased risk of sunburn.  The patient verbalized understanding of the proper use and possible adverse effects of retinoids.  All of the patient's questions and concerns were addressed. Azelaic Acid Counseling: Patient counseled that medicine may cause skin irritation and to avoid applying near the eyes.  In the event of skin irritation, the patient was advised to reduce the amount of the drug applied or use it less frequently.   The patient verbalized understanding of the proper use and possible adverse effects of azelaic acid.  All of the patient's questions and concerns were addressed. Detail Level: Detailed Sarecycline Counseling: Patient advised regarding possible photosensitivity and discoloration of the teeth, skin, lips, tongue and gums.  Patient instructed to avoid sunlight, if possible.  When exposed to sunlight, patients should wear protective clothing, sunglasses, and sunscreen.  The patient was instructed to call the office immediately if the following severe adverse effects occur:  hearing changes, easy bruising/bleeding, severe headache, or vision changes.  The patient verbalized understanding of the proper use and possible adverse effects of sarecycline.  All of the patient's questions and concerns were addressed. Erythromycin Pregnancy And Lactation Text: This medication is Pregnancy Category B and is considered safe during pregnancy. It is also excreted in breast milk. Isotretinoin Counseling: Patient should get monthly blood tests, not donate blood, not drive at night if vision affected, not share medication, and not undergo elective surgery for 6 months after tx completed. Side effects reviewed, pt to contact office should one occur. Bactrim Pregnancy And Lactation Text: This medication is Pregnancy Category D and is known to cause fetal risk.  It is also excreted in breast milk.

## 2024-07-27 NOTE — OB PROVIDER TRIAGE NOTE - NSOBPROVIDERNOTE_OBGYN_ALL_OB_FT
45yo  @26w1d presenting with R sided flank pain. Patient afebrile, no leukocytosis. UA significant for small LE, neg nitrite, +calcium oxalate cystals. Differential includes nephrolithiasis vs. pyelonephritis.  - f/u renal ultrasound  - LR@125    d/w Dr. Gorge Aguilar, PGY3    Signed out to day team at 07:26 45yo  @26w1d presenting with R sided flank pain. Patient afebrile, no leukocytosis. UA significant for small LE, neg nitrite, +calcium oxalate cystals. Differential includes nephrolithiasis vs. pyelonephritis.  - f/u renal ultrasound  - LR@125    d/w Dr. Gorge Aguilar, PGY3    Signed out to day team at 07:26  ________________________________________  -Received hand-off from night shift team  -Renal ultrasound completed. Patient states flank pain is subsiding, now 5/10 on pain scale.    09:30  -Ultrasound result negative for calculi or hydronephrosis.  -Plan discussed with Dr. Moreno and Dr. Brown. Due to previous incomplete treatment for pyelonephritis, patient is to be admitted for IV antibiotics and will transition to be antibiotic treatment after urine culture is resulted. VE performed per Dr. Brown, 0/0/-3.  -Admit to antepartum  -Routine labs ordered  -IV fluids to continue  -regular diet  -NST BID  -Flomax ordered due to calcium oxylate crystals noted in UA. Urine to be strained for calculi.  -Blood transfusion and induction/labor consents obtained. Risks, benefits, alternatives and possible complications have been discussed in detail with the patient in her native language. Pre-admission, admission, and post admission procedures and expectations were discussed in detail. All questions answered, all appropriate hospital consents were signed. Anticipate normal vaginal delivery. Informed Consent was obtained. The following was discussed:     Induction/Augmentation of Labor: Use of medication and/or cook balloon to begin or enhance labor  Obstetrical management including internal fetal/contraction monitoring  Normal vaginal delivery  Possible  Section: (surgical cut in the abdomen in order to deliver the baby)    d/w Dr. Moreno and Dr. Spangler PGY-3  [facility]

## 2024-07-27 NOTE — OB PROVIDER TRIAGE NOTE - NSHPPHYSICALEXAM_GEN_ALL_CORE
Vital Signs Last 24 Hrs  T(C): 36.6 (27 Jul 2024 03:01), Max: 36.6 (27 Jul 2024 03:01)  T(F): 97.9 (27 Jul 2024 03:01), Max: 97.9 (27 Jul 2024 03:01)  HR: 88 (27 Jul 2024 06:32) (88 - 112)  BP: 111/61 (27 Jul 2024 06:32) (111/61 - 113/66)  BP(mean): --  RR: 18 (27 Jul 2024 03:01) (18 - 18)  SpO2: 100% (27 Jul 2024 03:01) (100% - 100%)    Parameters below as of 27 Jul 2024 03:01  Patient On (Oxygen Delivery Method): room air    Gen: well-appearing, NAD  Back: R flank TTP, no CVA tenderness  Abd: gravid, soft, nontender

## 2024-07-28 LAB
BASOPHILS # BLD AUTO: 0.03 K/UL — SIGNIFICANT CHANGE UP (ref 0–0.2)
BASOPHILS NFR BLD AUTO: 0.4 % — SIGNIFICANT CHANGE UP (ref 0–2)
CULTURE RESULTS: SIGNIFICANT CHANGE UP
EOSINOPHIL # BLD AUTO: 0.13 K/UL — SIGNIFICANT CHANGE UP (ref 0–0.5)
EOSINOPHIL NFR BLD AUTO: 1.8 % — SIGNIFICANT CHANGE UP (ref 0–6)
HCT VFR BLD CALC: 29 % — LOW (ref 34.5–45)
HGB BLD-MCNC: 9.1 G/DL — LOW (ref 11.5–15.5)
IANC: 5.13 K/UL — SIGNIFICANT CHANGE UP (ref 1.8–7.4)
IMM GRANULOCYTES NFR BLD AUTO: 2.5 % — HIGH (ref 0–0.9)
LYMPHOCYTES # BLD AUTO: 1.13 K/UL — SIGNIFICANT CHANGE UP (ref 1–3.3)
LYMPHOCYTES # BLD AUTO: 15.7 % — SIGNIFICANT CHANGE UP (ref 13–44)
MCHC RBC-ENTMCNC: 26.5 PG — LOW (ref 27–34)
MCHC RBC-ENTMCNC: 31.4 GM/DL — LOW (ref 32–36)
MCV RBC AUTO: 84.5 FL — SIGNIFICANT CHANGE UP (ref 80–100)
MONOCYTES # BLD AUTO: 0.62 K/UL — SIGNIFICANT CHANGE UP (ref 0–0.9)
MONOCYTES NFR BLD AUTO: 8.6 % — SIGNIFICANT CHANGE UP (ref 2–14)
NEUTROPHILS # BLD AUTO: 5.13 K/UL — SIGNIFICANT CHANGE UP (ref 1.8–7.4)
NEUTROPHILS NFR BLD AUTO: 71 % — SIGNIFICANT CHANGE UP (ref 43–77)
NRBC # BLD: 0 /100 WBCS — SIGNIFICANT CHANGE UP (ref 0–0)
NRBC # FLD: 0 K/UL — SIGNIFICANT CHANGE UP (ref 0–0)
PLATELET # BLD AUTO: 182 K/UL — SIGNIFICANT CHANGE UP (ref 150–400)
RBC # BLD: 3.43 M/UL — LOW (ref 3.8–5.2)
RBC # FLD: 15.6 % — HIGH (ref 10.3–14.5)
SPECIMEN SOURCE: SIGNIFICANT CHANGE UP
T PALLIDUM AB TITR SER: NEGATIVE — SIGNIFICANT CHANGE UP
WBC # BLD: 7.22 K/UL — SIGNIFICANT CHANGE UP (ref 3.8–10.5)
WBC # FLD AUTO: 7.22 K/UL — SIGNIFICANT CHANGE UP (ref 3.8–10.5)

## 2024-07-28 PROCEDURE — 99232 SBSQ HOSP IP/OBS MODERATE 35: CPT | Mod: GC,25

## 2024-07-28 PROCEDURE — 59025 FETAL NON-STRESS TEST: CPT | Mod: 26,GC

## 2024-07-28 RX ORDER — CYCLOBENZAPRINE HCL 10 MG
10 TABLET ORAL DAILY
Refills: 0 | Status: DISCONTINUED | OUTPATIENT
Start: 2024-07-28 | End: 2024-07-29

## 2024-07-28 RX ORDER — ACETAMINOPHEN 500 MG
1000 TABLET ORAL ONCE
Refills: 0 | Status: COMPLETED | OUTPATIENT
Start: 2024-07-28 | End: 2024-07-28

## 2024-07-28 RX ORDER — NITROFURANTOIN (MACROCRYSTALS) 50 MG/1
100 CAPSULE ORAL
Refills: 0 | Status: DISCONTINUED | OUTPATIENT
Start: 2024-07-28 | End: 2024-07-29

## 2024-07-28 RX ORDER — CYCLOBENZAPRINE HCL 10 MG
5 TABLET ORAL ONCE
Refills: 0 | Status: DISCONTINUED | OUTPATIENT
Start: 2024-07-28 | End: 2024-07-28

## 2024-07-28 RX ORDER — LIDOCAINE 5% 5 G/100G
1 CREAM TOPICAL ONCE
Refills: 0 | Status: COMPLETED | OUTPATIENT
Start: 2024-07-28 | End: 2024-07-28

## 2024-07-28 RX ORDER — DEXTROSE MONOHYDRATE, SODIUM CHLORIDE, SODIUM LACTATE, CALCIUM CHLORIDE, MAGNESIUM CHLORIDE 1.5; 538; 448; 18.4; 5.08 G/100ML; MG/100ML; MG/100ML; MG/100ML; MG/100ML
1000 SOLUTION INTRAPERITONEAL ONCE
Refills: 0 | Status: COMPLETED | OUTPATIENT
Start: 2024-07-28 | End: 2024-07-28

## 2024-07-28 RX ORDER — ACETAMINOPHEN 500 MG
1000 TABLET ORAL ONCE
Refills: 0 | Status: COMPLETED | OUTPATIENT
Start: 2024-07-28 | End: 2024-07-29

## 2024-07-28 RX ADMIN — Medication 0.4 MILLIGRAM(S): at 11:12

## 2024-07-28 RX ADMIN — Medication 10 MILLIGRAM(S): at 13:01

## 2024-07-28 RX ADMIN — Medication 1000 MILLIGRAM(S): at 02:04

## 2024-07-28 RX ADMIN — DEXTROSE MONOHYDRATE, SODIUM CHLORIDE, SODIUM LACTATE, CALCIUM CHLORIDE, MAGNESIUM CHLORIDE 125 MILLILITER(S): 1.5; 538; 448; 18.4; 5.08 SOLUTION INTRAPERITONEAL at 08:22

## 2024-07-28 RX ADMIN — Medication 1000 MILLIGRAM(S): at 09:16

## 2024-07-28 RX ADMIN — Medication 400 MILLIGRAM(S): at 01:34

## 2024-07-28 RX ADMIN — LIDOCAINE 5% 1 PATCH: 5 CREAM TOPICAL at 22:13

## 2024-07-28 RX ADMIN — DEXTROSE MONOHYDRATE, SODIUM CHLORIDE, SODIUM LACTATE, CALCIUM CHLORIDE, MAGNESIUM CHLORIDE 1000 MILLILITER(S): 1.5; 538; 448; 18.4; 5.08 SOLUTION INTRAPERITONEAL at 11:10

## 2024-07-28 RX ADMIN — Medication 400 MILLIGRAM(S): at 08:30

## 2024-07-28 RX ADMIN — Medication 100 MILLIGRAM(S): at 09:27

## 2024-07-28 NOTE — PROGRESS NOTE ADULT - SUBJECTIVE AND OBJECTIVE BOX
R3 Antepartum Note,     **INCOMPLETE NOTE**      Patient seen and examined at bedside, no acute overnight events. No acute complaints. Pt reports +FM, denies LOF, VB, ctx, HA, epigastric pain, blurred vision, CP, SOB, N/V, fevers, and chills.    Vital Signs Last 24 Hours  T(C): 36.7 (07-28-24 @ 01:34), Max: 37 (07-27-24 @ 06:30)  HR: 108 (07-28-24 @ 01:36) (84 - 113)  BP: 99/54 (07-28-24 @ 01:36) (94/53 - 123/75)  RR: 20 (07-28-24 @ 01:34) (16 - 20)  SpO2: 94% (07-28-24 @ 01:36) (94% - 100%)    CAPILLARY BLOOD GLUCOSE          Physical Exam:  General: NAD  Abdomen: Soft, non-tender, gravid  Ext: No pain or swelling    Labs:             10.7   8.72  )-----------( 209      ( 07-27 @ 04:56 )             34.2     07-27 @ 04:56    135  |  103  |  4   ----------------------------<  95  4.0   |  21  |  0.53    Ca    10.4      07-27 @ 04:56    TPro  6.6  /  Alb  3.3  /  TBili  0.2  /  DBili  x   /  AST  15  /  ALT  10  /  AlkPhos  93  07-27 @ 04:56            MEDICATIONS  (STANDING):  cefTRIAXone   IVPB 1000 milliGRAM(s) IV Intermittent every 24 hours  lactated ringers. 1000 milliLiter(s) (125 mL/Hr) IV Continuous <Continuous>  tamsulosin 0.4 milliGRAM(s) Oral daily    MEDICATIONS  (PRN):  ondansetron    Tablet 4 milliGRAM(s) Oral every 6 hours PRN Nausea and/or Vomiting   R3 Antepartum Note,     Patient seen and examined at bedside, no acute overnight events. No acute complaints. Reports her back pain has been improving.  Denies dysruria.  Has not noticed any stones in her urine.  Pt reports +FM, denies LOF, VB, ctx, HA, epigastric pain, blurred vision, CP, SOB, N/V, fevers, and chills.    Vital Signs Last 24 Hours  T(C): 36.7 (07-28-24 @ 01:34), Max: 37 (07-27-24 @ 06:30)  HR: 108 (07-28-24 @ 01:36) (84 - 113)  BP: 99/54 (07-28-24 @ 01:36) (94/53 - 123/75)  RR: 20 (07-28-24 @ 01:34) (16 - 20)  SpO2: 94% (07-28-24 @ 01:36) (94% - 100%)    CAPILLARY BLOOD GLUCOSE          Physical Exam:  General: NAD  Abdomen: Soft, non-tender, gravid  Back: no CVA tenderness b/l  Ext: No pain or swelling    Labs:             10.7   8.72  )-----------( 209      ( 07-27 @ 04:56 )             34.2     07-27 @ 04:56    135  |  103  |  4   ----------------------------<  95  4.0   |  21  |  0.53    Ca    10.4      07-27 @ 04:56    TPro  6.6  /  Alb  3.3  /  TBili  0.2  /  DBili  x   /  AST  15  /  ALT  10  /  AlkPhos  93  07-27 @ 04:56            MEDICATIONS  (STANDING):  cefTRIAXone   IVPB 1000 milliGRAM(s) IV Intermittent every 24 hours  lactated ringers. 1000 milliLiter(s) (125 mL/Hr) IV Continuous <Continuous>  tamsulosin 0.4 milliGRAM(s) Oral daily    MEDICATIONS  (PRN):  ondansetron    Tablet 4 milliGRAM(s) Oral every 6 hours PRN Nausea and/or Vomiting   R3 Antepartum Note,     Patient seen and examined at bedside, no acute overnight events. No acute complaints. Reports her back pain has been improving.  Denies dysruria.  Has not noticed any stones in her urine.  Pt reports +FM, denies LOF, VB, ctx, HA, epigastric pain, blurred vision, CP, SOB, N/V, fevers, and chills.    Vital Signs Last 24 Hours  T(C): 36.7 (07-28-24 @ 01:34), Max: 37 (07-27-24 @ 06:30)  HR: 108 (07-28-24 @ 01:36) (84 - 113)  BP: 99/54 (07-28-24 @ 01:36) (94/53 - 123/75)  RR: 20 (07-28-24 @ 01:34) (16 - 20)  SpO2: 94% (07-28-24 @ 01:36) (94% - 100%)    Physical Exam:  General: NAD  Abdomen: Soft, non-tender, gravid  Back: no CVA tenderness b/l  Ext: No pain or swelling    Labs:             10.7   8.72  )-----------( 209      ( 07-27 @ 04:56 )             34.2     07-27 @ 04:56    135  |  103  |  4   ----------------------------<  95  4.0   |  21  |  0.53    Ca    10.4      07-27 @ 04:56    TPro  6.6  /  Alb  3.3  /  TBili  0.2  /  DBili  x   /  AST  15  /  ALT  10  /  AlkPhos  93  07-27 @ 04:56            MEDICATIONS  (STANDING):  cefTRIAXone   IVPB 1000 milliGRAM(s) IV Intermittent every 24 hours  lactated ringers. 1000 milliLiter(s) (125 mL/Hr) IV Continuous <Continuous>  tamsulosin 0.4 milliGRAM(s) Oral daily    MEDICATIONS  (PRN):  ondansetron    Tablet 4 milliGRAM(s) Oral every 6 hours PRN Nausea and/or Vomiting

## 2024-07-28 NOTE — PROGRESS NOTE ADULT - TIME BILLING
Reviewing chart including relevant outside records (if applicable)/notes/labs/imaging/other pertinent results, interview, exam, decision making, counseling, consent (if applicable), answering questions, coordination of care.

## 2024-07-28 NOTE — PROGRESS NOTE ADULT - ASSESSMENT
43yo  @26w1d presenting with R sided flank pain after incomplete treatment for pyelonephritis. Patient afebrile, no leukocytosis. UA significant for small LE, neg nitrite, +calcium oxalate cystals.   Patient admitted to antepartum service for IV antibiotics for inadequate pyelo treatment.     #Pyelonephritis  - c/w Ceftriaxone (-)  - c/w Flomax  - continue to strain urine to evaluate for stone    #Fetal wellbeing  - NST BID    #Maternal wellbeing  - Regular diet  - HSQ/SCDs for DVT ppx    **INCOMPLETE NOTE**  JENNA Gregg PGY3 43yo  @26w1d presenting with R sided flank pain after incomplete treatment for pyelonephritis. Patient afebrile, no leukocytosis. UA significant for small LE, neg nitrite, +calcium oxalate cystals.   Patient admitted to antepartum service for IV antibiotics for inadequate pyelo treatment.     #Pyelonephritis  - c/w Ceftriaxone (-)  - c/w Flomax  - continue to strain urine to evaluate for stone    #Fetal wellbeing  - NST BID    #Maternal wellbeing  - Regular diet  - HSQ/SCDs for DVT jase Gregg PGY3

## 2024-07-28 NOTE — PROGRESS NOTE ADULT - NSPROGADDITIONALINFOA_GEN_ALL_CORE
MFM Fellow Addendum     43 yo  at 26w1d admitted for right pyelonephritis versus nephrolithiasis.   On ceftriaxone   She is afebrile with no leukocytosis.   Renal sono demonstrates no hydronephrosis or nephrolithiasis. MFM Fellow Addendum     45 yo  at 26w1d admitted for right pyelonephritis versus nephrolithiasis. Patient has a history of LEFT pyelonephritis s/p admission on , was treated and sent home with bactrim based on sensitivities, with incomplete course of oral antibiotics. Patient presented with right flank pain, has been afebrile with no leukocytosis. Renal sono demonstrates no hydronephrosis or nephrolithiasis. Given her history, she was started on ceftriaxone (has now received two doses), given IV fluids and tamsulosin and urine has been strained. Patient clinically improving with no signs/symptoms of infection, and urine culture this hospitalization resulted negative. Patient stable for discharge to home, given over two weeks since pyelonephritis will discharge on nitrofurantoin for pyelo suppression to be continued for remaining duration of the pregnancy.     NST: 145 bpm, moderate variability, +accelerations, no decelerations  No contractions on toco   Reactive NST    Neal Burton MD, PGY-7   Patient seen and d/w Dr. Brown MFM Fellow Addendum     43 yo  at 26w1d admitted for right pyelonephritis versus nephrolithiasis. Patient has a history of LEFT pyelonephritis s/p admission on , was treated and sent home with bactrim based on sensitivities, with incomplete course of oral antibiotics. Patient presented with right flank pain, has been afebrile with no leukocytosis. Renal sono demonstrates no pathologic hydronephrosis or nephrolithiasis. Given her history, she was started on ceftriaxone (has now received two doses), given IV fluids and tamsulosin and urine has been strained. Urine culture this hospitalization resulted negative. Patient with continued right flank pain, still with concentrated urine, denies passage of stone or sediment, and is without labor complaints.     NST: 145 bpm, moderate variability, +accelerations, no decelerations  No contractions on toco   Reactive NST    Recommend 1L IVF bolus, continued IV hydration and continued tamsulosin. Will also add flexeril in event that flank pain is related to musculoskeletal etiology. Given recent re-admission and continued poor pain control, will continue to observe inpatient at this time. Upon plan for discharge to home (likely will discharge tomorrow morning), given over two weeks since pyelonephritis will discharge on nitrofurantoin for pyelonephritis suppression to be continued for remaining duration of the pregnancy.     Neal Burton MD, PGY-7   Patient seen and d/w Dr. Brown

## 2024-07-29 ENCOUNTER — TRANSCRIPTION ENCOUNTER (OUTPATIENT)
Age: 44
End: 2024-07-29

## 2024-07-29 VITALS
RESPIRATION RATE: 17 BRPM | OXYGEN SATURATION: 99 % | HEART RATE: 99 BPM | TEMPERATURE: 98 F | SYSTOLIC BLOOD PRESSURE: 108 MMHG | DIASTOLIC BLOOD PRESSURE: 56 MMHG

## 2024-07-29 PROCEDURE — 99232 SBSQ HOSP IP/OBS MODERATE 35: CPT

## 2024-07-29 RX ORDER — CYCLOBENZAPRINE HCL 10 MG
1 TABLET ORAL
Qty: 5 | Refills: 0
Start: 2024-07-29 | End: 2024-08-02

## 2024-07-29 RX ADMIN — Medication 4 MILLIGRAM(S): at 11:26

## 2024-07-29 RX ADMIN — Medication 400 MILLIGRAM(S): at 00:00

## 2024-07-29 RX ADMIN — LIDOCAINE 5% 1 PATCH: 5 CREAM TOPICAL at 10:13

## 2024-07-29 RX ADMIN — NITROFURANTOIN (MACROCRYSTALS) 100 MILLIGRAM(S): 50 CAPSULE ORAL at 09:33

## 2024-07-29 RX ADMIN — LIDOCAINE 5% 1 PATCH: 5 CREAM TOPICAL at 07:30

## 2024-07-29 RX ADMIN — Medication 1000 MILLIGRAM(S): at 00:30

## 2024-07-29 NOTE — PROGRESS NOTE ADULT - ASSESSMENT
45yo  @26w2d presenting with R sided flank pain after incomplete treatment for L pyelonephritis. Patient afebrile, no leukocytosis. UA significant for small LE, neg nitrite, +calcium oxalate cystals.  Patient admitted to antepartum service for IV antibiotics for inadequate pyelo treatment. Patient continues to endorse R paraspinal tenderness, but no CVA tenderness on exam. Suspect pain is likely 2/2 MSK etiology as it improved with Flexeril. Overnight maternal and fetal status overall reassuring.    #Pyelonephritis vs. MSK pain  - s/p Ceftriaxone (-)  - c/w Flomax  - continue to strain urine to evaluate for stone  - c/w Flexeril  - c/w Macrobid suppression (-)    #Fetal wellbeing  - NST BID    #Maternal wellbeing  - Regular diet  - HSQ/SCDs for DVT ppx    Jonh Spangler, PGY-3

## 2024-07-29 NOTE — DISCHARGE NOTE ANTEPARTUM - CARE PROVIDER_API CALL
ENRIKE MIRELES  [address], [address]  [place] [address]  Phone: [phone]  Fax: [phone]  Follow Up Time:

## 2024-07-29 NOTE — DISCHARGE NOTE ANTEPARTUM - HOSPITAL COURSE
45yo  @26w2d presenting with R sided flank pain after incomplete treatment for L pyelonephritis. Patient afebrile, no leukocytosis. UA significant for small LE, neg nitrite, +calcium oxalate cystals.  Patient admitted to antepartum service for IV antibiotics for inadequate pyelo treatment. Patient continues to endorse R paraspinal tenderness, but no CVA tenderness on exam. Suspect pain is likely 2/2 MSK etiology as it improved with Flexeril. Overnight maternal and fetal status overall reassuring. s/p Ceftriaxone (-).  c/w Macrobid suppression. Flexeril/ tylenol and PT for back pain.

## 2024-07-29 NOTE — PHYSICAL THERAPY INITIAL EVALUATION ADULT - PATIENT PROFILE REVIEW, REHAB EVAL
PT initial evaluation received and chart review completed. Pt agreeable to participate in PT evaluation./yes PT initial evaluation received and chart review completed. Pt agreeable to participate in PT evaluation. ACTIVITY: AMBULATE AS TOLERATED/yes

## 2024-07-29 NOTE — PHYSICAL THERAPY INITIAL EVALUATION ADULT - PLANNED THERAPY INTERVENTIONS, PT EVAL
bed mobility training/gait training/neuromuscular re-education/postural re-education/strengthening/stretching/transfer training

## 2024-07-29 NOTE — DISCHARGE NOTE ANTEPARTUM - MEDICATION SUMMARY - MEDICATIONS TO TAKE
I will START or STAY ON the medications listed below when I get home from the hospital:    Zofran Pump  -- Indication: For Hyperemesis    PNV Prenatal oral tablet  -- 1 tab(s) by mouth once a day  -- Indication: For Pregnancy    cyclobenzaprine 10 mg oral tablet  -- 1 tab(s) by mouth once a day as needed for  severe pain  -- Indication: For back pain    nitrofurantoin macrocrystals 100 mg oral capsule  -- 1 cap(s) by mouth once a day  -- Indication: For UTIs in pregnancy

## 2024-07-29 NOTE — DISCHARGE NOTE ANTEPARTUM - NS MD DC FALL RISK RISK
For information on Fall & Injury Prevention, visit: [email or web address] OR  [email or web address] OR  https://www.cdc.gov/steadi/patient.html

## 2024-07-29 NOTE — PROGRESS NOTE ADULT - SUBJECTIVE AND OBJECTIVE BOX
PGY3 Antepartum Note    Patient seen and examined at bedside in NAD. Reports R-sided paraspinal pain. Had relief from Flexeril yesterday. States Lidocaine patch isn't helping much. Denies any CTX, LOF or VB.  Reports good fetal movement. Denies HA, epigastric pain, blurred vision, CP, SOB, vomiting, fevers, and chills.    T(F): 98.9 (06:06)  HR: 98 (06:06)  BP: 100/58 (06:06)  RR: 17 (06:06)    Gen: NAD  Cardio: rrr, s1/s2  Pulm: ca b/l  Abd: gravid, nontender, no palpable contractions  Back: no CVA tenderness, R paraspinal tenderness reproducible with palpation  Ext: no edema,  no calf tenderness  SVE: deferred    Medications:  (ADM OVERRIDE): 1 Each (07-27-24 @ 10:00)  acetaminophen   IVPB ..: 400 mL/Hr (07-28-24 @ 08:30)  acetaminophen   IVPB ..: 400 mL/Hr (07-28-24 @ 01:34)  acetaminophen   IVPB ..: 400 mL/Hr (07-27-24 @ 18:10)  acetaminophen   IVPB ..: 400 mL/Hr (07-29-24 @ 00:00)  cefTRIAXone   IVPB: 100 mL/Hr (07-28-24 @ 09:27),  100 mL/Hr (07-27-24 @ 10:05)  cyclobenzaprine: 10 milliGRAM(s) (07-28-24 @ 13:01)  lactated ringers Bolus: 1000 mL/Hr (07-28-24 @ 11:10)  lactated ringers.: 125 mL/Hr (07-27-24 @ 07:48),  125 mL/Hr (07-27-24 @ 07:26)  lidocaine   4% Patch: 1 Patch (07-28-24 @ 22:13)  tamsulosin: 0.4 milliGRAM(s) (07-28-24 @ 11:12),  0.4 milliGRAM(s) (07-27-24 @ 14:05)      Labs:                        9.1    7.22  )-----------( 182      ( 28 Jul 2024 06:00 )             29.0

## 2024-07-29 NOTE — DISCHARGE NOTE ANTEPARTUM - CARE PLAN
1 Principal Discharge DX:	Back pain  Assessment and plan of treatment:	-F/up with OB within 1 week   - Return with contractions, vaginal bleeding, leaking fluid or decreased fetal movement  - Continue Macrobid once daily for duration of pregnancy for suppression of UTIs.  - Tylenol/Flexeril for back pain as needed  - Physical therapy

## 2024-07-29 NOTE — PHYSICAL THERAPY INITIAL EVALUATION ADULT - GENERAL OBSERVATIONS, REHAB EVAL
Upon entry, pt seated in bedside chair in NAD.  present at bedside. /56. Pt left as received with all tubes/lines intact, call bell in reach and in NAD.

## 2024-07-29 NOTE — PHYSICAL THERAPY INITIAL EVALUATION ADULT - GAIT DEVIATIONS NOTED, PT EVAL
forward flexed posture/decreased clare/decreased velocity of limb motion/decreased step length/decreased stride length/decreased weight-shifting ability

## 2024-07-29 NOTE — PROGRESS NOTE ADULT - ATTENDING COMMENTS
Patient seen and examined and history reviewed.   HAs mild flank pain in upper back but has no cva tenderness,  HAs some relief with pain medication and muscle relaxant.   On exam no localized tenderness but complains of musculoskeletal pain in the region much higher than renal fossa  Will continue treatement with pain medication and suppressive therapy for chronic UTI
MFM ATTENDING    45yo P0 at 26w1d with right sided flank pain.     pain still present today, same as yesterday.     afebrile  comfortable  no cva tenderness    repeat ucx neg    Plan today:   dc ceftriaxone, switch to macrobid suppression  continue flomax  LR bolus, continue maintenance at 125, encourage PO hydration.   flexeril  Continue to observe in house, consider dc once pain improves.    All questions answered to patients satisfaction.

## 2024-07-29 NOTE — DISCHARGE NOTE ANTEPARTUM - MEDICATION SUMMARY - MEDICATIONS TO STOP TAKING
I will STOP taking the medications listed below when I get home from the hospital:    sulfamethoxazole-trimethoprim 800 mg-160 mg oral tablet  -- 1 tab(s) by mouth every 12 hours

## 2024-07-29 NOTE — DISCHARGE NOTE ANTEPARTUM - PLAN OF CARE
-F/up with OB within 1 week   - Return with contractions, vaginal bleeding, leaking fluid or decreased fetal movement  - Continue Macrobid once daily for duration of pregnancy for suppression of UTIs.  - Tylenol/Flexeril for back pain as needed  - Physical therapy

## 2024-07-29 NOTE — PHYSICAL THERAPY INITIAL EVALUATION ADULT - PERTINENT HX OF CURRENT PROBLEM, REHAB EVAL
Pt is a 44 year old antepartum (26 wks) female presenting with R sided flank pain. Of note, patient was recently admitted on 7/11 for pyelonephritis and discharged home with antibiotics. Pt reports worsening pain associated with radiation to lower back and RLQ. Pt admitted for R sided flank pain after incomplete treatment for L pyelonephritis.

## 2024-07-29 NOTE — DISCHARGE NOTE ANTEPARTUM - PATIENT PORTAL LINK FT
You can access the FollowMyHealth Patient Portal offered by [facility] by registering at the following website: [email or web address]. By joining [facility]’s FollowMyHealth portal, you will also be able to view your health information using other applications (apps) compatible with our system.